# Patient Record
Sex: FEMALE | Race: WHITE | NOT HISPANIC OR LATINO | Employment: OTHER | URBAN - METROPOLITAN AREA
[De-identification: names, ages, dates, MRNs, and addresses within clinical notes are randomized per-mention and may not be internally consistent; named-entity substitution may affect disease eponyms.]

---

## 2017-06-13 ENCOUNTER — HOSPITAL ENCOUNTER (OUTPATIENT)
Dept: LAB | Facility: HOSPITAL | Age: 67
Setting detail: SPECIMEN
Discharge: HOME | End: 2017-06-13
Attending: INTERNAL MEDICINE | Admitting: INTERNAL MEDICINE

## 2017-10-19 ENCOUNTER — HOSPITAL ENCOUNTER (OUTPATIENT)
Dept: LAB | Facility: HOSPITAL | Age: 67
Setting detail: SPECIMEN
Discharge: HOME | End: 2017-10-19
Attending: INTERNAL MEDICINE | Admitting: INTERNAL MEDICINE

## 2017-10-19 LAB
ALBUMIN SERPL-MCNC: 3.7 G/DL (ref 3.5–4.8)
ALBUMIN/GLOB SERPL: 1.3 {RATIO} (ref 1–1.7)
ALP SERPL-CCNC: 69 IU/L (ref 32–91)
ALT SERPL-CCNC: 17 IU/L (ref 14–54)
ANION GAP SERPL CALC-SCNC: 13.1 MMOL/L (ref 10–20)
AST SERPL-CCNC: 21 IU/L (ref 15–41)
BILIRUB SERPL-MCNC: 0.1 MG/DL (ref 0.3–1.2)
BUN SERPL-MCNC: 7 MG/DL (ref 8–20)
BUN/CREAT SERPL: 8.8 (ref 5.4–26.2)
CALCIUM SERPL-MCNC: 9 MG/DL (ref 8.9–10.3)
CHLORIDE SERPL-SCNC: 92 MMOL/L (ref 101–111)
CONV CO2: 24 MMOL/L (ref 22–32)
CONV TOTAL PROTEIN: 6.5 G/DL (ref 6.1–7.9)
CREAT UR-MCNC: 0.8 MG/DL (ref 0.4–1)
GLOBULIN UR ELPH-MCNC: 2.8 G/DL (ref 2.5–3.8)
GLUCOSE SERPL-MCNC: 234 MG/DL (ref 65–99)
POTASSIUM SERPL-SCNC: 4.1 MMOL/L (ref 3.6–5.1)
SODIUM SERPL-SCNC: 125 MMOL/L (ref 136–144)

## 2017-12-06 ENCOUNTER — HOSPITAL ENCOUNTER (OUTPATIENT)
Dept: PREOP | Facility: HOSPITAL | Age: 67
Setting detail: HOSPITAL OUTPATIENT SURGERY
Discharge: HOME | End: 2017-12-06
Attending: INTERNAL MEDICINE | Admitting: INTERNAL MEDICINE

## 2017-12-06 ENCOUNTER — ON CAMPUS - OUTPATIENT (AMBULATORY)
Dept: URBAN - METROPOLITAN AREA HOSPITAL 85 | Facility: HOSPITAL | Age: 67
End: 2017-12-06
Payer: COMMERCIAL

## 2017-12-06 DIAGNOSIS — D12.3 BENIGN NEOPLASM OF TRANSVERSE COLON: ICD-10-CM

## 2017-12-06 DIAGNOSIS — Z86.010 PERSONAL HISTORY OF COLONIC POLYPS: ICD-10-CM

## 2017-12-06 DIAGNOSIS — D12.2 BENIGN NEOPLASM OF ASCENDING COLON: ICD-10-CM

## 2017-12-06 DIAGNOSIS — K57.30 DIVERTICULOSIS OF LARGE INTESTINE WITHOUT PERFORATION OR ABS: ICD-10-CM

## 2017-12-06 LAB
GLUCOSE BLD-MCNC: 182 MG/DL (ref 70–105)
GLUCOSE BLD-MCNC: 200 MG/DL (ref 70–105)

## 2017-12-06 PROCEDURE — 45385 COLONOSCOPY W/LESION REMOVAL: CPT | Mod: PT | Performed by: INTERNAL MEDICINE

## 2017-12-19 ENCOUNTER — HOSPITAL ENCOUNTER (OUTPATIENT)
Dept: LAB | Facility: HOSPITAL | Age: 67
Setting detail: SPECIMEN
Discharge: HOME | End: 2017-12-19
Attending: INTERNAL MEDICINE | Admitting: INTERNAL MEDICINE

## 2018-04-10 ENCOUNTER — HOSPITAL ENCOUNTER (OUTPATIENT)
Dept: LAB | Facility: HOSPITAL | Age: 68
Setting detail: SPECIMEN
Discharge: HOME | End: 2018-04-10
Attending: INTERNAL MEDICINE | Admitting: INTERNAL MEDICINE

## 2018-04-10 LAB
ALBUMIN SERPL-MCNC: 3.9 G/DL (ref 3.5–4.8)
ALBUMIN/GLOB SERPL: 1.5 {RATIO} (ref 1–1.7)
ALP SERPL-CCNC: 60 IU/L (ref 32–91)
ALT SERPL-CCNC: ABNORMAL IU/L (ref 14–54)
ANION GAP SERPL CALC-SCNC: 12.1 MMOL/L (ref 10–20)
AST SERPL-CCNC: ABNORMAL IU/L (ref 15–41)
BILIRUB SERPL-MCNC: ABNORMAL MG/DL (ref 0.3–1.2)
BUN SERPL-MCNC: 8 MG/DL (ref 8–20)
BUN/CREAT SERPL: 8 (ref 5.4–26.2)
CALCIUM SERPL-MCNC: 9 MG/DL (ref 8.9–10.3)
CHLORIDE SERPL-SCNC: 95 MMOL/L (ref 101–111)
CONV CO2: 25 MMOL/L (ref 22–32)
CONV TOTAL PROTEIN: 6.5 G/DL (ref 6.1–7.9)
CREAT UR-MCNC: 1 MG/DL (ref 0.4–1)
GLOBULIN UR ELPH-MCNC: 2.6 G/DL (ref 2.5–3.8)
GLUCOSE SERPL-MCNC: 127 MG/DL (ref 65–99)
POTASSIUM SERPL-SCNC: ABNORMAL MMOL/L (ref 3.6–5.1)
SODIUM SERPL-SCNC: 128 MMOL/L (ref 136–144)

## 2018-10-26 ENCOUNTER — HOSPITAL ENCOUNTER (OUTPATIENT)
Dept: LAB | Facility: HOSPITAL | Age: 68
Setting detail: SPECIMEN
Discharge: HOME | End: 2018-10-26
Attending: INTERNAL MEDICINE | Admitting: INTERNAL MEDICINE

## 2018-10-26 LAB
ALBUMIN SERPL-MCNC: 3.7 G/DL (ref 3.5–4.8)
ALBUMIN/GLOB SERPL: 1.4 {RATIO} (ref 1–1.7)
ALP SERPL-CCNC: 66 IU/L (ref 32–91)
ALT SERPL-CCNC: 16 IU/L (ref 14–54)
ANION GAP SERPL CALC-SCNC: 14.3 MMOL/L (ref 10–20)
AST SERPL-CCNC: 18 IU/L (ref 15–41)
BILIRUB SERPL-MCNC: 0.3 MG/DL (ref 0.3–1.2)
BUN SERPL-MCNC: 9 MG/DL (ref 8–20)
BUN/CREAT SERPL: 9 (ref 5.4–26.2)
CALCIUM SERPL-MCNC: 9.3 MG/DL (ref 8.9–10.3)
CHLORIDE SERPL-SCNC: 98 MMOL/L (ref 101–111)
CHOLEST SERPL-MCNC: 186 MG/DL
CHOLEST/HDLC SERPL: 2.9 {RATIO}
CONV CO2: 27 MMOL/L (ref 22–32)
CONV LDL CHOLESTEROL DIRECT: 108 MG/DL (ref 0–100)
CONV MICROALBUM.,U,RANDOM: 3 MG/L
CONV TOTAL PROTEIN: 6.3 G/DL (ref 6.1–7.9)
CREAT 24H UR-MCNC: 30.4 MG/DL
CREAT UR-MCNC: 1 MG/DL (ref 0.4–1)
GLOBULIN UR ELPH-MCNC: 2.6 G/DL (ref 2.5–3.8)
GLUCOSE SERPL-MCNC: 148 MG/DL (ref 65–99)
HBA1C MFR BLD: 8.2 % (ref 0–5.6)
HDLC SERPL-MCNC: 64 MG/DL
LDLC/HDLC SERPL: 1.7 {RATIO}
LIPID INTERPRETATION: ABNORMAL
MICROALBUMIN/CREAT UR: 9.9 UG/MG
POTASSIUM SERPL-SCNC: 4.3 MMOL/L (ref 3.6–5.1)
SODIUM SERPL-SCNC: 135 MMOL/L (ref 136–144)
TRIGL SERPL-MCNC: 127 MG/DL
TSH SERPL-ACNC: 2.33 UIU/ML (ref 0.34–5.6)
VLDLC SERPL CALC-MCNC: 14.4 MG/DL

## 2019-06-21 ENCOUNTER — OFFICE (AMBULATORY)
Dept: URBAN - METROPOLITAN AREA CLINIC 64 | Facility: CLINIC | Age: 69
End: 2019-06-21

## 2019-06-21 VITALS
DIASTOLIC BLOOD PRESSURE: 80 MMHG | SYSTOLIC BLOOD PRESSURE: 169 MMHG | HEART RATE: 78 BPM | WEIGHT: 176 LBS | HEIGHT: 59 IN

## 2019-06-21 DIAGNOSIS — R10.12 LEFT UPPER QUADRANT PAIN: ICD-10-CM

## 2019-06-21 DIAGNOSIS — K21.9 GASTRO-ESOPHAGEAL REFLUX DISEASE WITHOUT ESOPHAGITIS: ICD-10-CM

## 2019-06-21 DIAGNOSIS — R10.31 RIGHT LOWER QUADRANT PAIN: ICD-10-CM

## 2019-06-21 PROCEDURE — 99214 OFFICE O/P EST MOD 30 MIN: CPT | Performed by: INTERNAL MEDICINE

## 2019-06-21 RX ORDER — PANTOPRAZOLE SODIUM 40 MG/1
TABLET, DELAYED RELEASE ORAL
Qty: 180 | Refills: 3 | Status: COMPLETED
End: 2019-10-30

## 2019-06-24 RX ORDER — GABAPENTIN 400 MG/1
CAPSULE ORAL
Qty: 90 CAPSULE | Refills: 0 | OUTPATIENT
Start: 2019-06-24

## 2019-07-26 ENCOUNTER — OFFICE (AMBULATORY)
Dept: URBAN - METROPOLITAN AREA CLINIC 64 | Facility: CLINIC | Age: 69
End: 2019-07-26

## 2019-07-26 VITALS
SYSTOLIC BLOOD PRESSURE: 149 MMHG | DIASTOLIC BLOOD PRESSURE: 66 MMHG | HEART RATE: 62 BPM | WEIGHT: 169 LBS | HEIGHT: 59 IN

## 2019-07-26 DIAGNOSIS — R10.12 LEFT UPPER QUADRANT PAIN: ICD-10-CM

## 2019-07-26 DIAGNOSIS — D50.9 IRON DEFICIENCY ANEMIA, UNSPECIFIED: ICD-10-CM

## 2019-07-26 DIAGNOSIS — K21.9 GASTRO-ESOPHAGEAL REFLUX DISEASE WITHOUT ESOPHAGITIS: ICD-10-CM

## 2019-07-26 DIAGNOSIS — K31.84 GASTROPARESIS: ICD-10-CM

## 2019-07-26 PROCEDURE — 99213 OFFICE O/P EST LOW 20 MIN: CPT | Performed by: INTERNAL MEDICINE

## 2019-07-26 RX ORDER — OMEPRAZOLE 20 MG/1
20 CAPSULE, DELAYED RELEASE ORAL
Qty: 90 | Refills: 3 | Status: COMPLETED
Start: 2019-07-26 | End: 2020-02-06

## 2019-10-30 ENCOUNTER — OFFICE (AMBULATORY)
Dept: URBAN - METROPOLITAN AREA PATHOLOGY 4 | Facility: PATHOLOGY | Age: 69
End: 2019-10-30

## 2019-10-30 ENCOUNTER — ON CAMPUS - OUTPATIENT (AMBULATORY)
Dept: URBAN - METROPOLITAN AREA HOSPITAL 2 | Facility: HOSPITAL | Age: 69
End: 2019-10-30

## 2019-10-30 VITALS
HEART RATE: 66 BPM | SYSTOLIC BLOOD PRESSURE: 144 MMHG | RESPIRATION RATE: 16 BRPM | SYSTOLIC BLOOD PRESSURE: 119 MMHG | HEART RATE: 65 BPM | DIASTOLIC BLOOD PRESSURE: 60 MMHG | SYSTOLIC BLOOD PRESSURE: 142 MMHG | RESPIRATION RATE: 17 BRPM | SYSTOLIC BLOOD PRESSURE: 136 MMHG | DIASTOLIC BLOOD PRESSURE: 65 MMHG | TEMPERATURE: 97.5 F | DIASTOLIC BLOOD PRESSURE: 41 MMHG | SYSTOLIC BLOOD PRESSURE: 112 MMHG | HEART RATE: 68 BPM | RESPIRATION RATE: 18 BRPM | HEART RATE: 74 BPM | HEART RATE: 64 BPM | OXYGEN SATURATION: 99 % | SYSTOLIC BLOOD PRESSURE: 126 MMHG | HEART RATE: 73 BPM | HEART RATE: 67 BPM | SYSTOLIC BLOOD PRESSURE: 132 MMHG | HEIGHT: 59 IN | HEART RATE: 95 BPM | DIASTOLIC BLOOD PRESSURE: 39 MMHG | HEART RATE: 71 BPM | DIASTOLIC BLOOD PRESSURE: 63 MMHG | SYSTOLIC BLOOD PRESSURE: 135 MMHG | SYSTOLIC BLOOD PRESSURE: 148 MMHG | DIASTOLIC BLOOD PRESSURE: 62 MMHG | DIASTOLIC BLOOD PRESSURE: 57 MMHG | SYSTOLIC BLOOD PRESSURE: 131 MMHG | DIASTOLIC BLOOD PRESSURE: 61 MMHG | OXYGEN SATURATION: 98 % | WEIGHT: 162 LBS | DIASTOLIC BLOOD PRESSURE: 56 MMHG | OXYGEN SATURATION: 95 % | OXYGEN SATURATION: 100 % | HEART RATE: 70 BPM

## 2019-10-30 DIAGNOSIS — D12.2 BENIGN NEOPLASM OF ASCENDING COLON: ICD-10-CM

## 2019-10-30 DIAGNOSIS — D50.9 IRON DEFICIENCY ANEMIA, UNSPECIFIED: ICD-10-CM

## 2019-10-30 DIAGNOSIS — D12.3 BENIGN NEOPLASM OF TRANSVERSE COLON: ICD-10-CM

## 2019-10-30 DIAGNOSIS — K29.71 GASTRITIS, UNSPECIFIED, WITH BLEEDING: ICD-10-CM

## 2019-10-30 DIAGNOSIS — K57.30 DIVERTICULOSIS OF LARGE INTESTINE WITHOUT PERFORATION OR ABS: ICD-10-CM

## 2019-10-30 DIAGNOSIS — K31.89 OTHER DISEASES OF STOMACH AND DUODENUM: ICD-10-CM

## 2019-10-30 LAB
GI HISTOLOGY: A. UNSPECIFIED: (no result)
GI HISTOLOGY: B. SELECT: (no result)
GI HISTOLOGY: C. UNSPECIFIED: (no result)
GI HISTOLOGY: D. UNSPECIFIED: (no result)
GI HISTOLOGY: PDF REPORT: (no result)

## 2019-10-30 PROCEDURE — 45385 COLONOSCOPY W/LESION REMOVAL: CPT | Performed by: INTERNAL MEDICINE

## 2019-10-30 PROCEDURE — 43239 EGD BIOPSY SINGLE/MULTIPLE: CPT | Performed by: INTERNAL MEDICINE

## 2019-10-30 PROCEDURE — 88305 TISSUE EXAM BY PATHOLOGIST: CPT | Performed by: INTERNAL MEDICINE

## 2019-10-30 NOTE — SERVICEHPINOTES
surgery went well.  Right revision of knee replacement. doesn't know last hgb, thinks better. Colonoscopy 12/17 - 3 TA polyps, diverticulosis, sigmoid colon looping. Recall 2020. BREGD 7/16 - 53F Gay dilation, gastritis. Gastric bx neg h.pylori.BREGD 9/14 - gastritis and candida esophagitis. Gastric bx's neg.  DANNY COWAN  is a  69  female   who presents today for a  EGD-Colonoscopy   for   the indications listed below. The updated Patient Profile was reviewed prior to the procedure, in conjunction with the Physical Exam, including medical conditions, surgical procedures, medications, allergies, family history and social history. See Physical Exam time stamp below for date and time of HPI completion.Pre-operatively, I reviewed the indication(s) for the procedure, the risks of the procedure [including but not limited to: unexpected bleeding possibly requiring hospitalization and/or unplanned repeat procedures, perforation possibly requiring surgical treatment, missed lesions and complications of sedation/MAC (also explained by anesthesia staff)]. I have evaluated the patient for risks associated with the planned anesthesia and the procedure to be performed and find the patient an acceptable candidate for IV sedation.Multiple opportunities were provided for any questions or concerns, and all questions were answered satisfactorily before any anesthesia was administered. We will proceed with the planned procedure.BR

## 2021-04-14 PROCEDURE — U0003 INFECTIOUS AGENT DETECTION BY NUCLEIC ACID (DNA OR RNA); SEVERE ACUTE RESPIRATORY SYNDROME CORONAVIRUS 2 (SARS-COV-2) (CORONAVIRUS DISEASE [COVID-19]), AMPLIFIED PROBE TECHNIQUE, MAKING USE OF HIGH THROUGHPUT TECHNOLOGIES AS DESCRIBED BY CMS-2020-01-R: HCPCS

## 2021-04-23 ENCOUNTER — OFFICE (AMBULATORY)
Dept: URBAN - METROPOLITAN AREA CLINIC 64 | Facility: CLINIC | Age: 71
End: 2021-04-23

## 2021-04-23 VITALS
DIASTOLIC BLOOD PRESSURE: 69 MMHG | SYSTOLIC BLOOD PRESSURE: 158 MMHG | HEART RATE: 82 BPM | WEIGHT: 183 LBS | HEIGHT: 59 IN

## 2021-04-23 DIAGNOSIS — K59.00 CONSTIPATION, UNSPECIFIED: ICD-10-CM

## 2021-04-23 DIAGNOSIS — D64.9 ANEMIA, UNSPECIFIED: ICD-10-CM

## 2021-04-23 DIAGNOSIS — R10.9 UNSPECIFIED ABDOMINAL PAIN: ICD-10-CM

## 2021-04-23 PROCEDURE — 99213 OFFICE O/P EST LOW 20 MIN: CPT | Performed by: NURSE PRACTITIONER

## 2021-04-23 RX ORDER — DICYCLOMINE HYDROCHLORIDE 10 MG/1
20 CAPSULE ORAL
Qty: 60 | Refills: 6 | Status: COMPLETED
Start: 2021-04-23 | End: 2024-04-11

## 2021-06-22 ENCOUNTER — OFFICE (AMBULATORY)
Dept: URBAN - METROPOLITAN AREA CLINIC 64 | Facility: CLINIC | Age: 71
End: 2021-06-22

## 2021-06-22 VITALS
HEART RATE: 74 BPM | HEIGHT: 59 IN | DIASTOLIC BLOOD PRESSURE: 63 MMHG | SYSTOLIC BLOOD PRESSURE: 128 MMHG | WEIGHT: 196 LBS

## 2021-06-22 DIAGNOSIS — R10.84 GENERALIZED ABDOMINAL PAIN: ICD-10-CM

## 2021-06-22 DIAGNOSIS — K21.9 GASTRO-ESOPHAGEAL REFLUX DISEASE WITHOUT ESOPHAGITIS: ICD-10-CM

## 2021-06-22 DIAGNOSIS — R13.10 DYSPHAGIA, UNSPECIFIED: ICD-10-CM

## 2021-06-22 PROCEDURE — 99214 OFFICE O/P EST MOD 30 MIN: CPT | Performed by: INTERNAL MEDICINE

## 2021-06-22 RX ORDER — LANSOPRAZOLE 30 MG/1
60 CAPSULE, DELAYED RELEASE PELLETS ORAL
Qty: 60 | Refills: 11 | Status: COMPLETED
Start: 2021-06-22 | End: 2021-09-22

## 2021-06-22 RX ORDER — OMEPRAZOLE 40 MG/1
CAPSULE, DELAYED RELEASE ORAL
Qty: 60 | Refills: 3 | Status: COMPLETED
End: 2021-06-22

## 2021-08-23 ENCOUNTER — ON CAMPUS - OUTPATIENT (AMBULATORY)
Dept: URBAN - METROPOLITAN AREA HOSPITAL 2 | Facility: HOSPITAL | Age: 71
End: 2021-08-23

## 2021-08-23 VITALS
WEIGHT: 174 LBS | SYSTOLIC BLOOD PRESSURE: 132 MMHG | DIASTOLIC BLOOD PRESSURE: 77 MMHG | OXYGEN SATURATION: 98 % | DIASTOLIC BLOOD PRESSURE: 58 MMHG | HEART RATE: 66 BPM | OXYGEN SATURATION: 99 % | HEIGHT: 59 IN | HEART RATE: 70 BPM | RESPIRATION RATE: 18 BRPM | DIASTOLIC BLOOD PRESSURE: 53 MMHG | SYSTOLIC BLOOD PRESSURE: 134 MMHG | RESPIRATION RATE: 23 BRPM | SYSTOLIC BLOOD PRESSURE: 139 MMHG | TEMPERATURE: 95.7 F | SYSTOLIC BLOOD PRESSURE: 129 MMHG | DIASTOLIC BLOOD PRESSURE: 61 MMHG | DIASTOLIC BLOOD PRESSURE: 57 MMHG | RESPIRATION RATE: 16 BRPM | RESPIRATION RATE: 22 BRPM | RESPIRATION RATE: 20 BRPM | HEART RATE: 64 BPM | OXYGEN SATURATION: 97 % | DIASTOLIC BLOOD PRESSURE: 65 MMHG | RESPIRATION RATE: 17 BRPM | HEART RATE: 62 BPM | OXYGEN SATURATION: 100 % | SYSTOLIC BLOOD PRESSURE: 131 MMHG | HEART RATE: 69 BPM | SYSTOLIC BLOOD PRESSURE: 140 MMHG | DIASTOLIC BLOOD PRESSURE: 60 MMHG

## 2021-08-23 DIAGNOSIS — B37.81 CANDIDAL ESOPHAGITIS: ICD-10-CM

## 2021-08-23 DIAGNOSIS — R10.13 EPIGASTRIC PAIN: ICD-10-CM

## 2021-08-23 DIAGNOSIS — R13.10 DYSPHAGIA, UNSPECIFIED: ICD-10-CM

## 2021-08-23 DIAGNOSIS — K22.2 ESOPHAGEAL OBSTRUCTION: ICD-10-CM

## 2021-08-23 PROCEDURE — 43235 EGD DIAGNOSTIC BRUSH WASH: CPT | Performed by: INTERNAL MEDICINE

## 2021-08-23 PROCEDURE — 43450 DILATE ESOPHAGUS 1/MULT PASS: CPT | Performed by: INTERNAL MEDICINE

## 2021-08-23 RX ORDER — NYSTATIN 100000 [USP'U]/ML
2000 SUSPENSION ORAL
Qty: 280 | Refills: 1 | Status: COMPLETED
Start: 2021-08-23 | End: 2021-09-22

## 2021-08-23 RX ADMIN — Medication 8 GRAMS: at 15:57

## 2021-08-23 RX ADMIN — Medication 4 GRAMS: at 16:12

## 2021-09-16 ENCOUNTER — TRANSCRIBE ORDERS (OUTPATIENT)
Dept: PHYSICAL THERAPY | Facility: CLINIC | Age: 71
End: 2021-09-16

## 2021-09-16 DIAGNOSIS — M54.50 CHRONIC LOW BACK PAIN, UNSPECIFIED BACK PAIN LATERALITY, UNSPECIFIED WHETHER SCIATICA PRESENT: Primary | ICD-10-CM

## 2021-09-16 DIAGNOSIS — G89.29 CHRONIC LOW BACK PAIN, UNSPECIFIED BACK PAIN LATERALITY, UNSPECIFIED WHETHER SCIATICA PRESENT: Primary | ICD-10-CM

## 2021-09-22 ENCOUNTER — OFFICE (AMBULATORY)
Dept: URBAN - METROPOLITAN AREA CLINIC 64 | Facility: CLINIC | Age: 71
End: 2021-09-22

## 2021-09-22 VITALS
SYSTOLIC BLOOD PRESSURE: 143 MMHG | HEIGHT: 59 IN | HEART RATE: 73 BPM | DIASTOLIC BLOOD PRESSURE: 72 MMHG | WEIGHT: 178 LBS

## 2021-09-22 DIAGNOSIS — R10.84 GENERALIZED ABDOMINAL PAIN: ICD-10-CM

## 2021-09-22 DIAGNOSIS — K31.84 GASTROPARESIS: ICD-10-CM

## 2021-09-22 PROCEDURE — 99213 OFFICE O/P EST LOW 20 MIN: CPT | Performed by: INTERNAL MEDICINE

## 2021-09-22 RX ORDER — LANSOPRAZOLE 30 MG/1
60 CAPSULE, DELAYED RELEASE PELLETS ORAL
Qty: 60 | Refills: 11 | Status: COMPLETED
Start: 2021-06-22 | End: 2021-09-22

## 2021-09-22 RX ORDER — DEXLANSOPRAZOLE 60 MG/1
60 CAPSULE, DELAYED RELEASE ORAL
Qty: 90 | Refills: 3 | Status: COMPLETED
Start: 2021-09-22 | End: 2022-04-22

## 2021-12-30 ENCOUNTER — EMERGENCY VISIT (OUTPATIENT)
Dept: URBAN - METROPOLITAN AREA CLINIC 48 | Facility: CLINIC | Age: 71
End: 2021-12-30

## 2021-12-30 DIAGNOSIS — H04.123: ICD-10-CM

## 2021-12-30 PROCEDURE — 92002 INTRM OPH EXAM NEW PATIENT: CPT

## 2021-12-30 ASSESSMENT — VISUAL ACUITY
OS_CC: 20/50
OD_PH: 20/30
OD_CC: 20/50
OS_PH: 20/30
OU_CC: 20/40

## 2021-12-30 ASSESSMENT — TONOMETRY
OS_IOP_MMHG: 15
OD_IOP_MMHG: 15

## 2021-12-30 NOTE — PATIENT DISCUSSION
No foreign body found on lid eversion. Will start patient on Refresh Celluvisc 1gtt Q2-3H, OU. Will begin FML 1gtt BID OD for two weeks. RTC 2 week f/u.

## 2022-04-22 ENCOUNTER — OFFICE (AMBULATORY)
Dept: URBAN - METROPOLITAN AREA CLINIC 64 | Facility: CLINIC | Age: 72
End: 2022-04-22

## 2022-04-22 VITALS
HEIGHT: 59 IN | WEIGHT: 177 LBS | HEART RATE: 64 BPM | DIASTOLIC BLOOD PRESSURE: 75 MMHG | SYSTOLIC BLOOD PRESSURE: 140 MMHG

## 2022-04-22 DIAGNOSIS — K31.84 GASTROPARESIS: ICD-10-CM

## 2022-04-22 DIAGNOSIS — K21.9 GASTRO-ESOPHAGEAL REFLUX DISEASE WITHOUT ESOPHAGITIS: ICD-10-CM

## 2022-04-22 PROCEDURE — 99214 OFFICE O/P EST MOD 30 MIN: CPT | Performed by: INTERNAL MEDICINE

## 2022-05-16 ENCOUNTER — OFFICE (AMBULATORY)
Dept: URBAN - METROPOLITAN AREA CLINIC 51 | Facility: CLINIC | Age: 72
End: 2022-05-16
Payer: COMMERCIAL

## 2022-05-16 DIAGNOSIS — K62.89 OTHER SPECIFIED DISEASES OF ANUS AND RECTUM: ICD-10-CM

## 2022-05-16 DIAGNOSIS — R15.9 FULL INCONTINENCE OF FECES: ICD-10-CM

## 2022-05-16 PROCEDURE — 91122: CPT | Performed by: INTERNAL MEDICINE

## 2022-05-16 PROCEDURE — 91120: CPT | Mod: 59 | Performed by: INTERNAL MEDICINE

## 2022-05-24 ENCOUNTER — OFFICE (AMBULATORY)
Dept: URBAN - METROPOLITAN AREA CLINIC 64 | Facility: CLINIC | Age: 72
End: 2022-05-24

## 2022-05-24 VITALS
HEART RATE: 72 BPM | DIASTOLIC BLOOD PRESSURE: 67 MMHG | SYSTOLIC BLOOD PRESSURE: 136 MMHG | HEIGHT: 59 IN | WEIGHT: 181 LBS

## 2022-05-24 DIAGNOSIS — K31.84 GASTROPARESIS: ICD-10-CM

## 2022-05-24 DIAGNOSIS — K59.00 CONSTIPATION, UNSPECIFIED: ICD-10-CM

## 2022-05-24 PROCEDURE — 99213 OFFICE O/P EST LOW 20 MIN: CPT | Performed by: NURSE PRACTITIONER

## 2022-05-26 ENCOUNTER — PREP FOR SURGERY (OUTPATIENT)
Dept: OTHER | Facility: HOSPITAL | Age: 72
End: 2022-05-26

## 2022-05-26 ENCOUNTER — OFFICE VISIT (OUTPATIENT)
Dept: ORTHOPEDIC SURGERY | Facility: CLINIC | Age: 72
End: 2022-05-26

## 2022-05-26 VITALS
DIASTOLIC BLOOD PRESSURE: 71 MMHG | BODY MASS INDEX: 36.69 KG/M2 | HEART RATE: 69 BPM | HEIGHT: 59 IN | SYSTOLIC BLOOD PRESSURE: 115 MMHG | WEIGHT: 182 LBS

## 2022-05-26 DIAGNOSIS — R94.120 ABNORMAL AUDITORY FUNCTION STUDY: ICD-10-CM

## 2022-05-26 DIAGNOSIS — R79.9 ABNORMAL FINDING OF BLOOD CHEMISTRY, UNSPECIFIED: ICD-10-CM

## 2022-05-26 DIAGNOSIS — M19.011 OSTEOARTHRITIS OF RIGHT GLENOHUMERAL JOINT: Primary | ICD-10-CM

## 2022-05-26 DIAGNOSIS — M25.511 ACUTE PAIN OF RIGHT SHOULDER: Primary | ICD-10-CM

## 2022-05-26 DIAGNOSIS — R79.1 ABNORMAL COAGULATION PROFILE: ICD-10-CM

## 2022-05-26 DIAGNOSIS — M19.011 OSTEOARTHRITIS OF RIGHT GLENOHUMERAL JOINT: ICD-10-CM

## 2022-05-26 PROCEDURE — 99204 OFFICE O/P NEW MOD 45 MIN: CPT | Performed by: ORTHOPAEDIC SURGERY

## 2022-05-26 PROCEDURE — 97760 ORTHOTIC MGMT&TRAING 1ST ENC: CPT | Performed by: ORTHOPAEDIC SURGERY

## 2022-05-26 RX ORDER — PROCHLORPERAZINE 25 MG/1
SUPPOSITORY RECTAL
Status: ON HOLD | COMMUNITY
Start: 2022-05-13 | End: 2022-07-02

## 2022-05-26 RX ORDER — SPIRONOLACTONE 50 MG/1
50 TABLET, FILM COATED ORAL DAILY
COMMUNITY
Start: 2022-05-06 | End: 2022-07-03 | Stop reason: HOSPADM

## 2022-05-26 RX ORDER — OXYCODONE HCL 10 MG/1
10 TABLET, FILM COATED, EXTENDED RELEASE ORAL ONCE
Status: CANCELLED | OUTPATIENT
Start: 2022-05-26 | End: 2022-05-26

## 2022-05-26 RX ORDER — MELOXICAM 15 MG/1
15 TABLET ORAL ONCE
Status: CANCELLED | OUTPATIENT
Start: 2022-05-26 | End: 2022-05-26

## 2022-05-26 RX ORDER — TELMISARTAN 40 MG/1
40 TABLET ORAL 2 TIMES DAILY
COMMUNITY
Start: 2022-05-02

## 2022-05-26 RX ORDER — DEXTRAN 70/HYPROMELLOSE
1 DROPS OPHTHALMIC (EYE)
Status: ON HOLD | COMMUNITY
End: 2022-07-02

## 2022-05-26 RX ORDER — DEXLANSOPRAZOLE 60 MG/1
60 CAPSULE, DELAYED RELEASE ORAL DAILY
COMMUNITY

## 2022-05-26 RX ORDER — PROCHLORPERAZINE 25 MG/1
SUPPOSITORY RECTAL SEE ADMIN INSTRUCTIONS
Status: ON HOLD | COMMUNITY
Start: 2022-05-14 | End: 2022-07-02

## 2022-05-26 RX ORDER — PREGABALIN 75 MG/1
150 CAPSULE ORAL ONCE
Status: CANCELLED | OUTPATIENT
Start: 2022-05-26 | End: 2022-05-26

## 2022-05-26 RX ORDER — INSULIN LISPRO 100 [IU]/ML
16 INJECTION, SUSPENSION SUBCUTANEOUS 2 TIMES DAILY WITH MEALS
Status: ON HOLD | COMMUNITY
End: 2022-07-02

## 2022-05-26 RX ORDER — HYDRALAZINE HYDROCHLORIDE 100 MG/1
100 TABLET, FILM COATED ORAL 3 TIMES DAILY
COMMUNITY
Start: 2022-05-15

## 2022-05-26 RX ORDER — TRANEXAMIC ACID 10 MG/ML
1000 INJECTION, SOLUTION INTRAVENOUS ONCE
Status: CANCELLED | OUTPATIENT
Start: 2022-05-26 | End: 2022-05-26

## 2022-05-26 RX ORDER — PROCHLORPERAZINE 25 MG/1
SUPPOSITORY RECTAL
Status: ON HOLD | COMMUNITY
Start: 2022-05-20 | End: 2022-07-02

## 2022-05-26 RX ORDER — NORTRIPTYLINE HYDROCHLORIDE 75 MG/1
75 CAPSULE ORAL
COMMUNITY
Start: 2022-03-23 | End: 2022-07-03 | Stop reason: HOSPADM

## 2022-05-26 RX ORDER — NITROGLYCERIN 0.4 MG/1
TABLET SUBLINGUAL
Status: ON HOLD | COMMUNITY
End: 2022-07-02

## 2022-05-26 RX ORDER — DICYCLOMINE HYDROCHLORIDE 10 MG/1
CAPSULE ORAL
COMMUNITY
Start: 2022-03-21

## 2022-05-26 RX ORDER — GABAPENTIN 600 MG/1
600 TABLET ORAL 3 TIMES DAILY
COMMUNITY
Start: 2022-05-12

## 2022-05-26 RX ORDER — CLINDAMYCIN PHOSPHATE 900 MG/50ML
900 INJECTION, SOLUTION INTRAVENOUS ONCE
Status: CANCELLED | OUTPATIENT
Start: 2022-05-26 | End: 2022-05-26

## 2022-05-26 RX ORDER — GLIPIZIDE 10 MG/1
TABLET ORAL
COMMUNITY
End: 2022-05-26 | Stop reason: ALTCHOICE

## 2022-05-26 NOTE — PROGRESS NOTES
"     Patient ID: Ramona Luis is a 71 y.o. female.    Chief Complaint:    Chief Complaint   Patient presents with   • Right Shoulder - Pain       HPI:  This is a 71-year-old female here with longstanding right shoulder pain.  He has a distant history of rotator cuff repair with recent onset pain in the last several months.  She is taken oral medication without significant relief.  Pain is deep in the shoulder causing pain and weakness with overhead activity  Past Medical History:   Diagnosis Date   • Diabetes mellitus (HCC)    • Environmental allergies    • GERD (gastroesophageal reflux disease)    • Hypertension    • Migraine        No past surgical history on file.    Family History   Problem Relation Age of Onset   • Heart failure Mother    • Cancer Father           Social History     Occupational History   • Not on file   Tobacco Use   • Smoking status: Former Smoker     Quit date: 1980     Years since quittin.1   • Smokeless tobacco: Never Used   Substance and Sexual Activity   • Alcohol use: Yes     Comment: occasionally   • Drug use: Defer   • Sexual activity: Defer      Review of Systems   Cardiovascular: Negative for chest pain.   Musculoskeletal: Positive for arthralgias.       Objective:    /71   Pulse 69   Ht 149.9 cm (59\")   Wt 82.6 kg (182 lb)   BMI 36.76 kg/m²     Physical Examination:  Right shoulder demonstrates healed incision from mini open rotator cuff repair over the anterior lateral deltoid.  She has mild supraspinatus atrophy passive elevation 150 abduction 120 external rotation 30 internal rotation the right hip with pain and weakness on Speed, Callands, supraspinatus testing.  Belly press and lift off are 4/53/5 with active elevation of 90 degrees and intact deltoid function.  Sensory and motor exam are intact all distributions. Radial pulse is palpable and capillary refill is less than two seconds to all digits.    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  Postop sling dispensed.  Discussed the risks including bleeding, scar, infection, stiffness, nerve, artery, vein and tendon damage, instability, fracture, DVT, loss of life or limb. Issues of scapular notching and component revision were discussed. Questions were answered and addressed.  Greater than 15 minutes was spent demonstrating proper fit and use of the device and signs to monitor for complications      Procedures         Disclaimer: Part of this note may be an electronic transcription/translation of spoken language to printed text using the Dragon Dictation System

## 2022-05-31 ENCOUNTER — TELEPHONE (OUTPATIENT)
Dept: ORTHOPEDIC SURGERY | Facility: CLINIC | Age: 72
End: 2022-05-31

## 2022-05-31 NOTE — TELEPHONE ENCOUNTER
Caller: MARCELA CHOI    Relationship to patient:  SELF    Best call back number: [phone]    Patient is needing: PATIENT IS TRYING TO SCHEDULE SHOULDER SURGERY. SOMEONE CALLED HER TODAY AND UNABLE TO WARM TRANSFER.

## 2022-06-16 VITALS — BODY MASS INDEX: 36.73 KG/M2 | WEIGHT: 175 LBS | HEIGHT: 58 IN

## 2022-06-17 ENCOUNTER — PRE-ADMISSION TESTING (OUTPATIENT)
Dept: PREADMISSION TESTING | Facility: HOSPITAL | Age: 72
End: 2022-06-17

## 2022-06-17 ENCOUNTER — HOSPITAL ENCOUNTER (OUTPATIENT)
Dept: GENERAL RADIOLOGY | Facility: HOSPITAL | Age: 72
Discharge: HOME | End: 2022-06-17

## 2022-06-17 ENCOUNTER — HOSPITAL ENCOUNTER (OUTPATIENT)
Dept: CARDIOLOGY | Facility: HOSPITAL | Age: 72
Discharge: HOME | End: 2022-06-17

## 2022-06-17 ENCOUNTER — LAB (OUTPATIENT)
Dept: LAB | Facility: HOSPITAL | Age: 72
End: 2022-06-17

## 2022-06-17 DIAGNOSIS — R79.1 ABNORMAL COAGULATION PROFILE: ICD-10-CM

## 2022-06-17 DIAGNOSIS — M19.011 OSTEOARTHRITIS OF RIGHT GLENOHUMERAL JOINT: ICD-10-CM

## 2022-06-17 DIAGNOSIS — R79.9 ABNORMAL FINDING OF BLOOD CHEMISTRY, UNSPECIFIED: ICD-10-CM

## 2022-06-17 DIAGNOSIS — R94.120 ABNORMAL AUDITORY FUNCTION STUDY: ICD-10-CM

## 2022-06-17 LAB
ABO GROUP BLD: NORMAL
ANION GAP SERPL CALCULATED.3IONS-SCNC: 17.2 MMOL/L (ref 5–15)
APTT PPP: 28.4 SECONDS (ref 24–31)
BASOPHILS # BLD AUTO: 0.06 10*3/MM3 (ref 0–0.2)
BASOPHILS NFR BLD AUTO: 0.6 % (ref 0–1.5)
BLD GP AB SCN SERPL QL: NEGATIVE
BUN SERPL-MCNC: 17 MG/DL (ref 8–23)
BUN/CREAT SERPL: 14.9 (ref 7–25)
CALCIUM SPEC-SCNC: 9 MG/DL (ref 8.6–10.5)
CHLORIDE SERPL-SCNC: 89 MMOL/L (ref 98–107)
CO2 SERPL-SCNC: 20.8 MMOL/L (ref 22–29)
CREAT SERPL-MCNC: 1.14 MG/DL (ref 0.57–1)
DEPRECATED RDW RBC AUTO: 40.3 FL (ref 37–54)
EGFRCR SERPLBLD CKD-EPI 2021: 50.9 ML/MIN/1.73
EOSINOPHIL # BLD AUTO: 0.41 10*3/MM3 (ref 0–0.4)
EOSINOPHIL NFR BLD AUTO: 4.4 % (ref 0.3–6.2)
ERYTHROCYTE [DISTWIDTH] IN BLOOD BY AUTOMATED COUNT: 13 % (ref 12.3–15.4)
GLUCOSE SERPL-MCNC: 191 MG/DL (ref 65–99)
HBA1C MFR BLD: 8.7 % (ref 3.5–5.6)
HCT VFR BLD AUTO: 33.7 % (ref 34–46.6)
HGB BLD-MCNC: 10.9 G/DL (ref 12–15.9)
IMM GRANULOCYTES # BLD AUTO: 0.1 10*3/MM3 (ref 0–0.05)
IMM GRANULOCYTES NFR BLD AUTO: 1.1 % (ref 0–0.5)
INR PPP: 0.98 (ref 0.93–1.1)
LYMPHOCYTES # BLD AUTO: 1.7 10*3/MM3 (ref 0.7–3.1)
LYMPHOCYTES NFR BLD AUTO: 18.2 % (ref 19.6–45.3)
MCH RBC QN AUTO: 27.8 PG (ref 26.6–33)
MCHC RBC AUTO-ENTMCNC: 32.3 G/DL (ref 31.5–35.7)
MCV RBC AUTO: 86 FL (ref 79–97)
MONOCYTES # BLD AUTO: 0.76 10*3/MM3 (ref 0.1–0.9)
MONOCYTES NFR BLD AUTO: 8.1 % (ref 5–12)
MRSA DNA SPEC QL NAA+PROBE: NORMAL
NEUTROPHILS NFR BLD AUTO: 6.31 10*3/MM3 (ref 1.7–7)
NEUTROPHILS NFR BLD AUTO: 67.6 % (ref 42.7–76)
NRBC BLD AUTO-RTO: 0 /100 WBC (ref 0–0.2)
PLATELET # BLD AUTO: 382 10*3/MM3 (ref 140–450)
PMV BLD AUTO: 9.6 FL (ref 6–12)
POTASSIUM SERPL-SCNC: 5.1 MMOL/L (ref 3.5–5.2)
PROTHROMBIN TIME: 10.1 SECONDS (ref 9.6–11.7)
RBC # BLD AUTO: 3.92 10*6/MM3 (ref 3.77–5.28)
RH BLD: POSITIVE
SODIUM SERPL-SCNC: 127 MMOL/L (ref 136–145)
T&S EXPIRATION DATE: NORMAL
WBC NRBC COR # BLD: 9.34 10*3/MM3 (ref 3.4–10.8)

## 2022-06-17 PROCEDURE — 86900 BLOOD TYPING SEROLOGIC ABO: CPT

## 2022-06-17 PROCEDURE — 83036 HEMOGLOBIN GLYCOSYLATED A1C: CPT

## 2022-06-17 PROCEDURE — 97165 OT EVAL LOW COMPLEX 30 MIN: CPT

## 2022-06-17 PROCEDURE — 86850 RBC ANTIBODY SCREEN: CPT

## 2022-06-17 PROCEDURE — 87641 MR-STAPH DNA AMP PROBE: CPT

## 2022-06-17 PROCEDURE — 85730 THROMBOPLASTIN TIME PARTIAL: CPT

## 2022-06-17 PROCEDURE — 71046 X-RAY EXAM CHEST 2 VIEWS: CPT

## 2022-06-17 PROCEDURE — 80048 BASIC METABOLIC PNL TOTAL CA: CPT

## 2022-06-17 PROCEDURE — 86901 BLOOD TYPING SEROLOGIC RH(D): CPT

## 2022-06-17 PROCEDURE — 93010 ELECTROCARDIOGRAM REPORT: CPT | Performed by: INTERNAL MEDICINE

## 2022-06-17 PROCEDURE — 85610 PROTHROMBIN TIME: CPT

## 2022-06-17 PROCEDURE — 93005 ELECTROCARDIOGRAM TRACING: CPT | Performed by: ORTHOPAEDIC SURGERY

## 2022-06-17 PROCEDURE — 36415 COLL VENOUS BLD VENIPUNCTURE: CPT

## 2022-06-17 PROCEDURE — 85025 COMPLETE CBC W/AUTO DIFF WBC: CPT

## 2022-06-19 LAB — QT INTERVAL: 381 MS

## 2022-06-27 ENCOUNTER — LAB (OUTPATIENT)
Dept: LAB | Facility: HOSPITAL | Age: 72
End: 2022-06-27

## 2022-06-27 ENCOUNTER — APPOINTMENT (OUTPATIENT)
Dept: LAB | Facility: HOSPITAL | Age: 72
End: 2022-06-27

## 2022-06-27 DIAGNOSIS — M19.011 OSTEOARTHRITIS OF RIGHT GLENOHUMERAL JOINT: ICD-10-CM

## 2022-06-27 PROCEDURE — C9803 HOPD COVID-19 SPEC COLLECT: HCPCS

## 2022-06-27 PROCEDURE — U0004 COV-19 TEST NON-CDC HGH THRU: HCPCS

## 2022-06-28 ENCOUNTER — ANESTHESIA EVENT (OUTPATIENT)
Dept: PERIOP | Facility: HOSPITAL | Age: 72
End: 2022-06-28

## 2022-06-28 LAB — SARS-COV-2 ORF1AB RESP QL NAA+PROBE: NOT DETECTED

## 2022-06-29 ENCOUNTER — HOSPITAL ENCOUNTER (OUTPATIENT)
Facility: HOSPITAL | Age: 72
Discharge: HOME | End: 2022-06-30
Attending: ORTHOPAEDIC SURGERY | Admitting: ORTHOPAEDIC SURGERY

## 2022-06-29 ENCOUNTER — ANESTHESIA (OUTPATIENT)
Dept: PERIOP | Facility: HOSPITAL | Age: 72
End: 2022-06-29

## 2022-06-29 ENCOUNTER — APPOINTMENT (OUTPATIENT)
Dept: GENERAL RADIOLOGY | Facility: HOSPITAL | Age: 72
End: 2022-06-29

## 2022-06-29 DIAGNOSIS — Z96.611 STATUS POST REVERSE TOTAL SHOULDER REPLACEMENT, RIGHT: ICD-10-CM

## 2022-06-29 DIAGNOSIS — M19.011 OSTEOARTHRITIS OF RIGHT GLENOHUMERAL JOINT: ICD-10-CM

## 2022-06-29 DIAGNOSIS — M19.011 PRIMARY OSTEOARTHRITIS OF RIGHT SHOULDER: Primary | ICD-10-CM

## 2022-06-29 LAB
GLUCOSE BLDC GLUCOMTR-MCNC: 182 MG/DL (ref 70–105)
GLUCOSE BLDC GLUCOMTR-MCNC: 246 MG/DL (ref 70–105)

## 2022-06-29 PROCEDURE — 25010000002 ONDANSETRON PER 1 MG

## 2022-06-29 PROCEDURE — 25010000002 MIDAZOLAM PER 1 MG: Performed by: ANESTHESIOLOGY

## 2022-06-29 PROCEDURE — G0378 HOSPITAL OBSERVATION PER HR: HCPCS

## 2022-06-29 PROCEDURE — 63710000001 TRAMADOL 50 MG TABLET: Performed by: ORTHOPAEDIC SURGERY

## 2022-06-29 PROCEDURE — 76942 ECHO GUIDE FOR BIOPSY: CPT | Performed by: ORTHOPAEDIC SURGERY

## 2022-06-29 PROCEDURE — 23472 RECONSTRUCT SHOULDER JOINT: CPT | Performed by: NURSE PRACTITIONER

## 2022-06-29 PROCEDURE — A9270 NON-COVERED ITEM OR SERVICE: HCPCS | Performed by: ORTHOPAEDIC SURGERY

## 2022-06-29 PROCEDURE — 25010000002 PROPOFOL 10 MG/ML EMULSION

## 2022-06-29 PROCEDURE — 99214 OFFICE O/P EST MOD 30 MIN: CPT | Performed by: HOSPITALIST

## 2022-06-29 PROCEDURE — 63710000001 GLIPIZIDE 5 MG TABLET: Performed by: ORTHOPAEDIC SURGERY

## 2022-06-29 PROCEDURE — 63710000001 GABAPENTIN 600 MG TABLET: Performed by: ORTHOPAEDIC SURGERY

## 2022-06-29 PROCEDURE — 63710000001 INSULIN REGULAR HUMAN PER 5 UNITS: Performed by: ANESTHESIOLOGY

## 2022-06-29 PROCEDURE — 63710000001 METFORMIN ER 500 MG TABLET SUSTAINED-RELEASE 24 HOUR: Performed by: ORTHOPAEDIC SURGERY

## 2022-06-29 PROCEDURE — 25010000002 DEXAMETHASONE PER 1 MG: Performed by: ANESTHESIOLOGY

## 2022-06-29 PROCEDURE — A9270 NON-COVERED ITEM OR SERVICE: HCPCS | Performed by: ANESTHESIOLOGY

## 2022-06-29 PROCEDURE — 23472 RECONSTRUCT SHOULDER JOINT: CPT | Performed by: ORTHOPAEDIC SURGERY

## 2022-06-29 PROCEDURE — 63710000001 MONTELUKAST 10 MG TABLET: Performed by: ORTHOPAEDIC SURGERY

## 2022-06-29 PROCEDURE — 63710000001 POVIDONE-IODINE 10 % SOLUTION 118 ML BOTTLE: Performed by: ORTHOPAEDIC SURGERY

## 2022-06-29 PROCEDURE — C1713 ANCHOR/SCREW BN/BN,TIS/BN: HCPCS | Performed by: ORTHOPAEDIC SURGERY

## 2022-06-29 PROCEDURE — 73030 X-RAY EXAM OF SHOULDER: CPT

## 2022-06-29 PROCEDURE — 63710000001 ACETAMINOPHEN 325 MG TABLET: Performed by: ORTHOPAEDIC SURGERY

## 2022-06-29 PROCEDURE — 25010000002 VANCOMYCIN 1 G RECONSTITUTED SOLUTION 1 EACH VIAL: Performed by: ORTHOPAEDIC SURGERY

## 2022-06-29 PROCEDURE — 25010000002 CEFTRIAXONE PER 250 MG: Performed by: ORTHOPAEDIC SURGERY

## 2022-06-29 PROCEDURE — 63710000001 HYDRALAZINE 25 MG TABLET: Performed by: ORTHOPAEDIC SURGERY

## 2022-06-29 PROCEDURE — 63710000001 MELOXICAM 15 MG TABLET: Performed by: ORTHOPAEDIC SURGERY

## 2022-06-29 PROCEDURE — 25010000002 FENTANYL CITRATE (PF) 100 MCG/2ML SOLUTION

## 2022-06-29 PROCEDURE — C9290 INJ, BUPIVACAINE LIPOSOME: HCPCS | Performed by: ANESTHESIOLOGY

## 2022-06-29 PROCEDURE — 25010000002 MORPHINE PER 10 MG: Performed by: ORTHOPAEDIC SURGERY

## 2022-06-29 PROCEDURE — 63710000001 NORTRIPTYLINE 25 MG CAPSULE: Performed by: ORTHOPAEDIC SURGERY

## 2022-06-29 PROCEDURE — 97110 THERAPEUTIC EXERCISES: CPT

## 2022-06-29 PROCEDURE — 63710000001 BENZOYL PEROXIDE 10 % LIQUID: Performed by: ORTHOPAEDIC SURGERY

## 2022-06-29 PROCEDURE — C1776 JOINT DEVICE (IMPLANTABLE): HCPCS | Performed by: ORTHOPAEDIC SURGERY

## 2022-06-29 PROCEDURE — 0 BUPIVACAINE LIPOSOME 1.3 % SUSPENSION: Performed by: ANESTHESIOLOGY

## 2022-06-29 PROCEDURE — 82962 GLUCOSE BLOOD TEST: CPT

## 2022-06-29 DEVICE — TOTL REV SHLDR DJO: Type: IMPLANTABLE DEVICE | Site: SHOULDER | Status: FUNCTIONAL

## 2022-06-29 DEVICE — IMPLANTABLE DEVICE: Type: IMPLANTABLE DEVICE | Site: SHOULDER | Status: FUNCTIONAL

## 2022-06-29 DEVICE — STEM HUM ALTIVATE STD 8X108MM: Type: IMPLANTABLE DEVICE | Site: SHOULDER | Status: FUNCTIONAL

## 2022-06-29 DEVICE — BASEPLT GLEN REV RSP TI 26X30MM: Type: IMPLANTABLE DEVICE | Site: SHOULDER | Status: FUNCTIONAL

## 2022-06-29 DEVICE — SCREW, LOCKING BONE, RSP, 5X30
Type: IMPLANTABLE DEVICE | Site: SHOULDER | Status: FUNCTIONAL
Brand: DJO SURGICAL

## 2022-06-29 DEVICE — SUT NONABS MAXBRAID/PE NMBR2 HC5 38IN BLU 900334: Type: IMPLANTABLE DEVICE | Site: SHOULDER | Status: FUNCTIONAL

## 2022-06-29 DEVICE — SCREW, LOCKING BONE, RSP, 5X34
Type: IMPLANTABLE DEVICE | Site: SHOULDER | Status: FUNCTIONAL
Brand: DJO SURGICAL

## 2022-06-29 DEVICE — SCREW, LOCKING BONE, RSP, 5X14
Type: IMPLANTABLE DEVICE | Site: SHOULDER | Status: FUNCTIONAL
Brand: DJO SURGICAL

## 2022-06-29 DEVICE — INSRT SOCKT HUM/SHLDR ALTIVATE/REVERSE HXE/PE SZ32 SM: Type: IMPLANTABLE DEVICE | Site: SHOULDER | Status: FUNCTIONAL

## 2022-06-29 RX ORDER — ONDANSETRON 2 MG/ML
INJECTION INTRAMUSCULAR; INTRAVENOUS AS NEEDED
Status: DISCONTINUED | OUTPATIENT
Start: 2022-06-29 | End: 2022-06-29 | Stop reason: SURG

## 2022-06-29 RX ORDER — PHENYLEPHRINE HCL IN 0.9% NACL 1 MG/10 ML
SYRINGE (ML) INTRAVENOUS AS NEEDED
Status: DISCONTINUED | OUTPATIENT
Start: 2022-06-29 | End: 2022-06-29 | Stop reason: SURG

## 2022-06-29 RX ORDER — NORTRIPTYLINE HYDROCHLORIDE 25 MG/1
75 CAPSULE ORAL NIGHTLY
Status: DISCONTINUED | OUTPATIENT
Start: 2022-06-29 | End: 2022-06-30 | Stop reason: HOSPADM

## 2022-06-29 RX ORDER — FENTANYL CITRATE 50 UG/ML
50 INJECTION, SOLUTION INTRAMUSCULAR; INTRAVENOUS
Status: DISCONTINUED | OUTPATIENT
Start: 2022-06-29 | End: 2022-06-29 | Stop reason: HOSPADM

## 2022-06-29 RX ORDER — IPRATROPIUM BROMIDE AND ALBUTEROL SULFATE 2.5; .5 MG/3ML; MG/3ML
3 SOLUTION RESPIRATORY (INHALATION) ONCE AS NEEDED
Status: DISCONTINUED | OUTPATIENT
Start: 2022-06-29 | End: 2022-06-29 | Stop reason: HOSPADM

## 2022-06-29 RX ORDER — DIPHENHYDRAMINE HYDROCHLORIDE 50 MG/ML
25 INJECTION INTRAMUSCULAR; INTRAVENOUS NIGHTLY PRN
Status: DISCONTINUED | OUTPATIENT
Start: 2022-06-29 | End: 2022-06-30 | Stop reason: HOSPADM

## 2022-06-29 RX ORDER — CLINDAMYCIN PHOSPHATE 900 MG/50ML
900 INJECTION, SOLUTION INTRAVENOUS ONCE
Status: COMPLETED | OUTPATIENT
Start: 2022-06-29 | End: 2022-06-29

## 2022-06-29 RX ORDER — GABAPENTIN 600 MG/1
600 TABLET ORAL 3 TIMES DAILY
Status: DISCONTINUED | OUTPATIENT
Start: 2022-06-29 | End: 2022-06-30 | Stop reason: HOSPADM

## 2022-06-29 RX ORDER — ONDANSETRON 2 MG/ML
4 INJECTION INTRAMUSCULAR; INTRAVENOUS EVERY 6 HOURS PRN
Status: DISCONTINUED | OUTPATIENT
Start: 2022-06-29 | End: 2022-06-30 | Stop reason: HOSPADM

## 2022-06-29 RX ORDER — FENTANYL CITRATE 50 UG/ML
25 INJECTION, SOLUTION INTRAMUSCULAR; INTRAVENOUS
Status: DISCONTINUED | OUTPATIENT
Start: 2022-06-29 | End: 2022-06-29 | Stop reason: HOSPADM

## 2022-06-29 RX ORDER — BUPIVACAINE HYDROCHLORIDE 5 MG/ML
INJECTION, SOLUTION EPIDURAL; INTRACAUDAL
Status: COMPLETED | OUTPATIENT
Start: 2022-06-29 | End: 2022-06-29

## 2022-06-29 RX ORDER — PROMETHAZINE HYDROCHLORIDE 12.5 MG/1
12.5 TABLET ORAL EVERY 6 HOURS PRN
Qty: 21 TABLET | Refills: 1 | Status: ON HOLD | OUTPATIENT
Start: 2022-06-29 | End: 2022-07-02

## 2022-06-29 RX ORDER — LOSARTAN POTASSIUM 25 MG/1
25 TABLET ORAL
Status: DISCONTINUED | OUTPATIENT
Start: 2022-06-30 | End: 2022-06-30 | Stop reason: HOSPADM

## 2022-06-29 RX ORDER — MIDAZOLAM HYDROCHLORIDE 1 MG/ML
0.5 INJECTION INTRAMUSCULAR; INTRAVENOUS
Status: DISCONTINUED | OUTPATIENT
Start: 2022-06-29 | End: 2022-06-29 | Stop reason: HOSPADM

## 2022-06-29 RX ORDER — SODIUM CHLORIDE, SODIUM LACTATE, POTASSIUM CHLORIDE, CALCIUM CHLORIDE 600; 310; 30; 20 MG/100ML; MG/100ML; MG/100ML; MG/100ML
100 INJECTION, SOLUTION INTRAVENOUS CONTINUOUS
Status: DISCONTINUED | OUTPATIENT
Start: 2022-06-29 | End: 2022-06-30 | Stop reason: HOSPADM

## 2022-06-29 RX ORDER — TRAMADOL HYDROCHLORIDE 50 MG/1
50 TABLET ORAL EVERY 6 HOURS PRN
Status: DISCONTINUED | OUTPATIENT
Start: 2022-06-29 | End: 2022-06-30 | Stop reason: HOSPADM

## 2022-06-29 RX ORDER — ONDANSETRON 4 MG/1
4 TABLET, FILM COATED ORAL EVERY 6 HOURS PRN
Status: DISCONTINUED | OUTPATIENT
Start: 2022-06-29 | End: 2022-06-30 | Stop reason: HOSPADM

## 2022-06-29 RX ORDER — NALOXONE HCL 0.4 MG/ML
0.4 VIAL (ML) INJECTION
Status: DISCONTINUED | OUTPATIENT
Start: 2022-06-29 | End: 2022-06-30 | Stop reason: HOSPADM

## 2022-06-29 RX ORDER — ROCURONIUM BROMIDE 10 MG/ML
INJECTION, SOLUTION INTRAVENOUS AS NEEDED
Status: DISCONTINUED | OUTPATIENT
Start: 2022-06-29 | End: 2022-06-29 | Stop reason: SURG

## 2022-06-29 RX ORDER — FLUMAZENIL 0.1 MG/ML
0.1 INJECTION INTRAVENOUS AS NEEDED
Status: DISCONTINUED | OUTPATIENT
Start: 2022-06-29 | End: 2022-06-29 | Stop reason: HOSPADM

## 2022-06-29 RX ORDER — ONDANSETRON 2 MG/ML
4 INJECTION INTRAMUSCULAR; INTRAVENOUS ONCE AS NEEDED
Status: DISCONTINUED | OUTPATIENT
Start: 2022-06-29 | End: 2022-06-29 | Stop reason: HOSPADM

## 2022-06-29 RX ORDER — CETIRIZINE HYDROCHLORIDE 10 MG/1
10 TABLET ORAL DAILY
Status: DISCONTINUED | OUTPATIENT
Start: 2022-06-30 | End: 2022-06-30 | Stop reason: HOSPADM

## 2022-06-29 RX ORDER — BISACODYL 10 MG
10 SUPPOSITORY, RECTAL RECTAL DAILY PRN
Status: DISCONTINUED | OUTPATIENT
Start: 2022-06-29 | End: 2022-06-30 | Stop reason: HOSPADM

## 2022-06-29 RX ORDER — MELOXICAM 15 MG/1
15 TABLET ORAL DAILY
Status: DISCONTINUED | OUTPATIENT
Start: 2022-06-29 | End: 2022-06-30 | Stop reason: HOSPADM

## 2022-06-29 RX ORDER — METFORMIN HYDROCHLORIDE 500 MG/1
1000 TABLET, EXTENDED RELEASE ORAL 2 TIMES DAILY
Status: DISCONTINUED | OUTPATIENT
Start: 2022-06-29 | End: 2022-06-30 | Stop reason: HOSPADM

## 2022-06-29 RX ORDER — SODIUM CHLORIDE 0.9 % (FLUSH) 0.9 %
10 SYRINGE (ML) INJECTION AS NEEDED
Status: DISCONTINUED | OUTPATIENT
Start: 2022-06-29 | End: 2022-06-29 | Stop reason: HOSPADM

## 2022-06-29 RX ORDER — ACETAMINOPHEN 650 MG/1
650 SUPPOSITORY RECTAL EVERY 4 HOURS PRN
Status: DISCONTINUED | OUTPATIENT
Start: 2022-06-29 | End: 2022-06-30 | Stop reason: HOSPADM

## 2022-06-29 RX ORDER — PREGABALIN 75 MG/1
150 CAPSULE ORAL ONCE
Status: DISCONTINUED | OUTPATIENT
Start: 2022-06-29 | End: 2022-06-29

## 2022-06-29 RX ORDER — NALOXONE HCL 0.4 MG/ML
0.4 VIAL (ML) INJECTION AS NEEDED
Status: DISCONTINUED | OUTPATIENT
Start: 2022-06-29 | End: 2022-06-29 | Stop reason: HOSPADM

## 2022-06-29 RX ORDER — LABETALOL HYDROCHLORIDE 5 MG/ML
5 INJECTION, SOLUTION INTRAVENOUS
Status: DISCONTINUED | OUTPATIENT
Start: 2022-06-29 | End: 2022-06-29 | Stop reason: HOSPADM

## 2022-06-29 RX ORDER — SODIUM CHLORIDE 0.9 % (FLUSH) 0.9 %
10 SYRINGE (ML) INJECTION EVERY 12 HOURS SCHEDULED
Status: DISCONTINUED | OUTPATIENT
Start: 2022-06-29 | End: 2022-06-29 | Stop reason: HOSPADM

## 2022-06-29 RX ORDER — OXYCODONE HYDROCHLORIDE 5 MG/1
10 TABLET ORAL EVERY 4 HOURS PRN
Status: DISCONTINUED | OUTPATIENT
Start: 2022-06-29 | End: 2022-06-30 | Stop reason: HOSPADM

## 2022-06-29 RX ORDER — MIDAZOLAM HYDROCHLORIDE 1 MG/ML
INJECTION INTRAMUSCULAR; INTRAVENOUS
Status: COMPLETED | OUTPATIENT
Start: 2022-06-29 | End: 2022-06-29

## 2022-06-29 RX ORDER — ACETAMINOPHEN 325 MG/1
650 TABLET ORAL EVERY 4 HOURS PRN
Status: DISCONTINUED | OUTPATIENT
Start: 2022-06-29 | End: 2022-06-30 | Stop reason: HOSPADM

## 2022-06-29 RX ORDER — HYDRALAZINE HYDROCHLORIDE 25 MG/1
100 TABLET, FILM COATED ORAL 4 TIMES DAILY
Status: DISCONTINUED | OUTPATIENT
Start: 2022-06-29 | End: 2022-06-30 | Stop reason: HOSPADM

## 2022-06-29 RX ORDER — CLINDAMYCIN PHOSPHATE 900 MG/50ML
900 INJECTION, SOLUTION INTRAVENOUS EVERY 8 HOURS
Status: DISCONTINUED | OUTPATIENT
Start: 2022-06-29 | End: 2022-06-30 | Stop reason: HOSPADM

## 2022-06-29 RX ORDER — MIDAZOLAM HYDROCHLORIDE 1 MG/ML
1 INJECTION INTRAMUSCULAR; INTRAVENOUS
Status: DISCONTINUED | OUTPATIENT
Start: 2022-06-29 | End: 2022-06-29 | Stop reason: HOSPADM

## 2022-06-29 RX ORDER — FUROSEMIDE 20 MG/1
20 TABLET ORAL DAILY
Status: DISCONTINUED | OUTPATIENT
Start: 2022-06-30 | End: 2022-06-30 | Stop reason: HOSPADM

## 2022-06-29 RX ORDER — OXYCODONE HCL 10 MG/1
10 TABLET, FILM COATED, EXTENDED RELEASE ORAL ONCE
Status: DISCONTINUED | OUTPATIENT
Start: 2022-06-29 | End: 2022-06-29

## 2022-06-29 RX ORDER — TRANEXAMIC ACID 10 MG/ML
1000 INJECTION, SOLUTION INTRAVENOUS ONCE
Status: DISCONTINUED | OUTPATIENT
Start: 2022-06-29 | End: 2022-06-29 | Stop reason: HOSPADM

## 2022-06-29 RX ORDER — PANTOPRAZOLE SODIUM 40 MG/1
40 TABLET, DELAYED RELEASE ORAL EVERY MORNING
Status: DISCONTINUED | OUTPATIENT
Start: 2022-06-30 | End: 2022-06-30 | Stop reason: HOSPADM

## 2022-06-29 RX ORDER — GLIPIZIDE 5 MG/1
1.25 TABLET ORAL
Status: DISCONTINUED | OUTPATIENT
Start: 2022-06-29 | End: 2022-06-30 | Stop reason: HOSPADM

## 2022-06-29 RX ORDER — MONTELUKAST SODIUM 10 MG/1
10 TABLET ORAL DAILY
Status: DISCONTINUED | OUTPATIENT
Start: 2022-06-29 | End: 2022-06-30 | Stop reason: HOSPADM

## 2022-06-29 RX ORDER — MELOXICAM 15 MG/1
15 TABLET ORAL ONCE
Status: COMPLETED | OUTPATIENT
Start: 2022-06-29 | End: 2022-06-29

## 2022-06-29 RX ORDER — PROPOFOL 10 MG/ML
VIAL (ML) INTRAVENOUS AS NEEDED
Status: DISCONTINUED | OUTPATIENT
Start: 2022-06-29 | End: 2022-06-29 | Stop reason: SURG

## 2022-06-29 RX ORDER — FENTANYL CITRATE 50 UG/ML
INJECTION, SOLUTION INTRAMUSCULAR; INTRAVENOUS AS NEEDED
Status: DISCONTINUED | OUTPATIENT
Start: 2022-06-29 | End: 2022-06-29 | Stop reason: SURG

## 2022-06-29 RX ORDER — LIDOCAINE HYDROCHLORIDE 10 MG/ML
INJECTION, SOLUTION EPIDURAL; INFILTRATION; INTRACAUDAL; PERINEURAL AS NEEDED
Status: DISCONTINUED | OUTPATIENT
Start: 2022-06-29 | End: 2022-06-29 | Stop reason: SURG

## 2022-06-29 RX ORDER — SODIUM CHLORIDE, SODIUM LACTATE, POTASSIUM CHLORIDE, CALCIUM CHLORIDE 600; 310; 30; 20 MG/100ML; MG/100ML; MG/100ML; MG/100ML
9 INJECTION, SOLUTION INTRAVENOUS CONTINUOUS PRN
Status: DISCONTINUED | OUTPATIENT
Start: 2022-06-29 | End: 2022-06-29 | Stop reason: HOSPADM

## 2022-06-29 RX ORDER — DEXAMETHASONE SODIUM PHOSPHATE 4 MG/ML
INJECTION, SOLUTION INTRA-ARTICULAR; INTRALESIONAL; INTRAMUSCULAR; INTRAVENOUS; SOFT TISSUE
Status: COMPLETED | OUTPATIENT
Start: 2022-06-29 | End: 2022-06-29

## 2022-06-29 RX ORDER — DIPHENHYDRAMINE HCL 25 MG
25 CAPSULE ORAL EVERY 6 HOURS PRN
Status: DISCONTINUED | OUTPATIENT
Start: 2022-06-29 | End: 2022-06-30 | Stop reason: HOSPADM

## 2022-06-29 RX ORDER — DIPHENHYDRAMINE HCL 25 MG
25 CAPSULE ORAL NIGHTLY PRN
Status: DISCONTINUED | OUTPATIENT
Start: 2022-06-29 | End: 2022-06-30 | Stop reason: HOSPADM

## 2022-06-29 RX ORDER — HYDROCODONE BITARTRATE AND ACETAMINOPHEN 7.5; 325 MG/1; MG/1
1 TABLET ORAL EVERY 6 HOURS PRN
Qty: 28 TABLET | Refills: 0 | Status: ON HOLD | OUTPATIENT
Start: 2022-06-29 | End: 2022-07-02

## 2022-06-29 RX ORDER — SPIRONOLACTONE 25 MG/1
50 TABLET ORAL DAILY
Status: DISCONTINUED | OUTPATIENT
Start: 2022-06-30 | End: 2022-06-30 | Stop reason: HOSPADM

## 2022-06-29 RX ORDER — SODIUM CHLORIDE 9 MG/ML
75 INJECTION, SOLUTION INTRAVENOUS CONTINUOUS
Status: DISCONTINUED | OUTPATIENT
Start: 2022-06-29 | End: 2022-06-30 | Stop reason: HOSPADM

## 2022-06-29 RX ORDER — NITROGLYCERIN 0.4 MG/1
0.4 TABLET SUBLINGUAL
Status: DISCONTINUED | OUTPATIENT
Start: 2022-06-29 | End: 2022-06-30 | Stop reason: HOSPADM

## 2022-06-29 RX ORDER — ASPIRIN 325 MG
325 TABLET, DELAYED RELEASE (ENTERIC COATED) ORAL EVERY 12 HOURS SCHEDULED
Status: DISCONTINUED | OUTPATIENT
Start: 2022-06-30 | End: 2022-06-30 | Stop reason: HOSPADM

## 2022-06-29 RX ADMIN — PROPOFOL 125 MCG/KG/MIN: 10 INJECTION, EMULSION INTRAVENOUS at 13:01

## 2022-06-29 RX ADMIN — METFORMIN HYDROCHLORIDE 1000 MG: 500 TABLET, EXTENDED RELEASE ORAL at 22:28

## 2022-06-29 RX ADMIN — TRAMADOL HYDROCHLORIDE 50 MG: 50 TABLET, COATED ORAL at 22:33

## 2022-06-29 RX ADMIN — MIDAZOLAM 2 MG: 1 INJECTION INTRAMUSCULAR; INTRAVENOUS at 11:01

## 2022-06-29 RX ADMIN — Medication 100 MCG: at 13:08

## 2022-06-29 RX ADMIN — PROPOFOL 150 MG: 10 INJECTION, EMULSION INTRAVENOUS at 12:58

## 2022-06-29 RX ADMIN — Medication 100 MCG: at 13:20

## 2022-06-29 RX ADMIN — MELOXICAM 15 MG: 15 TABLET ORAL at 10:35

## 2022-06-29 RX ADMIN — GLIPIZIDE 1.25 MG: 5 TABLET ORAL at 22:28

## 2022-06-29 RX ADMIN — GABAPENTIN 600 MG: 600 TABLET, FILM COATED ORAL at 22:28

## 2022-06-29 RX ADMIN — Medication 100 MCG: at 13:41

## 2022-06-29 RX ADMIN — LIDOCAINE HYDROCHLORIDE 50 MG: 10 INJECTION, SOLUTION EPIDURAL; INFILTRATION; INTRACAUDAL; PERINEURAL at 12:58

## 2022-06-29 RX ADMIN — NORTRIPTYLINE HYDROCHLORIDE 75 MG: 25 CAPSULE ORAL at 22:33

## 2022-06-29 RX ADMIN — INSULIN HUMAN 8 UNITS: 100 INJECTION, SOLUTION PARENTERAL at 11:25

## 2022-06-29 RX ADMIN — BUPIVACAINE 10 ML: 13.3 INJECTION, SUSPENSION, LIPOSOMAL INFILTRATION at 11:01

## 2022-06-29 RX ADMIN — SODIUM CHLORIDE 75 ML/HR: 9 INJECTION, SOLUTION INTRAVENOUS at 22:50

## 2022-06-29 RX ADMIN — MORPHINE SULFATE 2 MG: 4 INJECTION INTRAVENOUS at 22:44

## 2022-06-29 RX ADMIN — FENTANYL CITRATE 100 MCG: 50 INJECTION, SOLUTION INTRAMUSCULAR; INTRAVENOUS at 12:58

## 2022-06-29 RX ADMIN — MELOXICAM 15 MG: 15 TABLET ORAL at 19:17

## 2022-06-29 RX ADMIN — ONDANSETRON 4 MG: 2 INJECTION INTRAMUSCULAR; INTRAVENOUS at 14:13

## 2022-06-29 RX ADMIN — CLINDAMYCIN PHOSPHATE 900 MG: 900 INJECTION, SOLUTION INTRAVENOUS at 13:01

## 2022-06-29 RX ADMIN — MONTELUKAST 10 MG: 10 TABLET, FILM COATED ORAL at 22:28

## 2022-06-29 RX ADMIN — ROCURONIUM BROMIDE 50 MG: 50 INJECTION, SOLUTION INTRAVENOUS at 12:58

## 2022-06-29 RX ADMIN — DEXAMETHASONE SODIUM PHOSPHATE 4 MG: 4 INJECTION, SOLUTION INTRAMUSCULAR; INTRAVENOUS at 11:01

## 2022-06-29 RX ADMIN — CLINDAMYCIN PHOSPHATE 900 MG: 900 INJECTION, SOLUTION INTRAVENOUS at 22:28

## 2022-06-29 RX ADMIN — BUPIVACAINE HYDROCHLORIDE 10 ML: 5 INJECTION, SOLUTION EPIDURAL; INTRACAUDAL; PERINEURAL at 11:01

## 2022-06-29 RX ADMIN — ACETAMINOPHEN 650 MG: 325 TABLET ORAL at 19:17

## 2022-06-29 RX ADMIN — BENZOYL PEROXIDE: 10 SUSPENSION TOPICAL at 10:06

## 2022-06-29 RX ADMIN — SODIUM CHLORIDE, POTASSIUM CHLORIDE, SODIUM LACTATE AND CALCIUM CHLORIDE 9 ML/HR: 600; 310; 30; 20 INJECTION, SOLUTION INTRAVENOUS at 10:47

## 2022-06-29 RX ADMIN — HYDRALAZINE HYDROCHLORIDE 100 MG: 25 TABLET, FILM COATED ORAL at 22:28

## 2022-06-29 RX ADMIN — ROCURONIUM BROMIDE 20 MG: 50 INJECTION, SOLUTION INTRAVENOUS at 13:41

## 2022-06-29 RX ADMIN — DEXAMETHASONE SODIUM PHOSPHATE 4 MG: 4 INJECTION, SOLUTION INTRAMUSCULAR; INTRAVENOUS at 13:08

## 2022-06-29 NOTE — ANESTHESIA PREPROCEDURE EVALUATION
Anesthesia Evaluation     Patient summary reviewed and Nursing notes reviewed   NPO Solid Status: > 8 hours  NPO Liquid Status: > 8 hours           Airway   Mallampati: II  TM distance: >3 FB  Neck ROM: full  No difficulty expected  Dental - normal exam     Pulmonary - normal exam   (+) sleep apnea,   Cardiovascular - normal exam    (+) hypertension, valvular problems/murmurs,       Neuro/Psych  (+) headaches,    GI/Hepatic/Renal/Endo    (+) obesity,  GERD,  diabetes mellitus,     Musculoskeletal     Abdominal  - normal exam    Bowel sounds: normal.   Substance History      OB/GYN          Other   arthritis,                      Anesthesia Plan    ASA 3     general with block     intravenous induction     Anesthetic plan, risks, benefits, and alternatives have been provided, discussed and informed consent has been obtained with: patient.        CODE STATUS:

## 2022-06-29 NOTE — ANESTHESIA PROCEDURE NOTES
Peripheral Block      Patient reassessed immediately prior to procedure    Patient location during procedure: OR  Start time: 6/29/2022 10:52 AM  Stop time: 6/29/2022 11:02 AM  Reason for block: at surgeon's request and post-op pain management  Performed by  Anesthesiologist: Salvador Perez MD  Assisted by: Kyaw Gonzalez RN  Preanesthetic Checklist  Completed: patient identified, IV checked, site marked, risks and benefits discussed, surgical consent, monitors and equipment checked, pre-op evaluation and timeout performed  Prep:  Pt Position: supine  Sterile barriers:cap, gloves, mask and alcohol skin prep  Prep: ChloraPrep  Patient monitoring: blood pressure monitoring, continuous pulse oximetry and EKG  Procedure    Sedation: yes  Performed under: local infiltration  Guidance:ultrasound guided    ULTRASOUND INTERPRETATION.  Using ultrasound guidance a 20 G gauge needle was placed in close proximity to the brachial plexus nerve, at which point, under ultrasound guidance anesthetic was injected in the area of the nerve and spread of the anesthesia was seen on ultrasound in close proximity thereto.  There were no abnormalities seen on ultrasound; a digital image was taken; and the patient tolerated the procedure with no complications. Images:still images obtained, printed/placed on chart    Laterality:right  Block Type:interscalene  Injection Technique:single-shot  Needle Type:echogenic  Needle Gauge:20 G  Resistance on Injection: none  Sedation medications used: midazolam (VERSED) injection, 2 mg  Medications Used: dexamethasone (DECADRON) injection, 4 mg  bupivacaine liposome (EXPAREL) injection 1.3%, 10 mL  bupivacaine PF (MARCAINE) injection 0.5%, 10 mL  Med administered at 6/29/2022 11:01 AM      Post Assessment  Injection Assessment: negative aspiration for heme, no paresthesia on injection and incremental injection  Patient Tolerance:comfortable throughout block  Complications:no

## 2022-06-29 NOTE — ANESTHESIA POSTPROCEDURE EVALUATION
Patient: Ramona Luis    Procedure Summary     Date: 06/29/22 Room / Location: [facility] OR [address] [place] [address] MAIN OR    Anesthesia Start: 1251 Anesthesia Stop: 1441    Procedure: TOTAL SHOULDER REVERSE ARTHROPLASTY (Right Shoulder) Diagnosis:       Osteoarthritis of right glenohumeral joint      (Osteoarthritis of right glenohumeral joint [M19.011])    Surgeons: Connor Briceno MD Provider: Salvador Perez MD    Anesthesia Type: general with block ASA Status: 3          Anesthesia Type: general with block    Vitals  Vitals Value Taken Time   /61 06/29/22 1542   Temp 96.7 °F (35.9 °C) 06/29/22 1441   Pulse 58 06/29/22 1542   Resp 16 06/29/22 1525   SpO2 97 % 06/29/22 1542   Vitals shown include unvalidated device data.        Post Anesthesia Care and Evaluation    Patient location during evaluation: PACU  Patient participation: complete - patient participated  Level of consciousness: awake  Pain scale: See nurse's notes for pain score.  Pain management: adequate    Airway patency: patent  Anesthetic complications: No anesthetic complications  PONV Status: none  Cardiovascular status: acceptable  Respiratory status: acceptable  Hydration status: acceptable    Comments: Patient seen and examined postoperatively; vital signs stable; SpO2 greater than or equal to 90%; cardiopulmonary status stable; nausea/vomiting adequately controlled; pain adequately controlled; no apparent anesthesia complications; patient discharged from anesthesia care when discharge criteria were met

## 2022-06-29 NOTE — ANESTHESIA PROCEDURE NOTES
Airway  Urgency: elective    Date/Time: 6/29/2022 1:01 PM  Airway not difficult    General Information and Staff    Patient location during procedure: OR  Anesthesiologist: Salvador Perez MD CRNA/LANDON: Mauricio Sullivan CAA    Indications and Patient Condition  Indications for airway management: airway protection    Preoxygenated: yes  Mask difficulty assessment: 1 - vent by mask    Final Airway Details  Final airway type: endotracheal airway      Successful airway: ETT  Cuffed: yes   Successful intubation technique: direct laryngoscopy  Facilitating devices/methods: intubating stylet  Endotracheal tube insertion site: oral  Blade: Lorna  Blade size: 3  ETT size (mm): 7.5  Cormack-Lehane Classification: grade I - full view of glottis  Placement verified by: chest auscultation and capnometry   Measured from: lips  ETT/EBT  to lips (cm): 21  Number of attempts at approach: 1  Assessment: lips, teeth, and gum same as pre-op and atraumatic intubation

## 2022-06-30 ENCOUNTER — HOME HEALTH ADMISSION (OUTPATIENT)
Dept: HOME HEALTH SERVICES | Facility: HOME HEALTHCARE | Age: 72
End: 2022-06-30

## 2022-06-30 VITALS
HEIGHT: 58 IN | SYSTOLIC BLOOD PRESSURE: 145 MMHG | DIASTOLIC BLOOD PRESSURE: 69 MMHG | WEIGHT: 181.6 LBS | HEART RATE: 65 BPM | TEMPERATURE: 98 F | RESPIRATION RATE: 12 BRPM | BODY MASS INDEX: 38.12 KG/M2 | OXYGEN SATURATION: 96 %

## 2022-06-30 LAB
ANION GAP SERPL CALCULATED.3IONS-SCNC: 11 MMOL/L (ref 5–15)
BUN SERPL-MCNC: 18 MG/DL (ref 8–23)
BUN/CREAT SERPL: 14.3 (ref 7–25)
CALCIUM SPEC-SCNC: 8.1 MG/DL (ref 8.6–10.5)
CHLORIDE SERPL-SCNC: 88 MMOL/L (ref 98–107)
CO2 SERPL-SCNC: 19 MMOL/L (ref 22–29)
CREAT SERPL-MCNC: 1.26 MG/DL (ref 0.57–1)
EGFRCR SERPLBLD CKD-EPI 2021: 45.2 ML/MIN/1.73
GLUCOSE BLDC GLUCOMTR-MCNC: 313 MG/DL (ref 70–105)
GLUCOSE SERPL-MCNC: 311 MG/DL (ref 65–99)
HCT VFR BLD AUTO: 29.6 % (ref 34–46.6)
HGB BLD-MCNC: 9.8 G/DL (ref 12–15.9)
POTASSIUM SERPL-SCNC: 6.3 MMOL/L (ref 3.5–5.2)
SODIUM SERPL-SCNC: 118 MMOL/L (ref 136–145)

## 2022-06-30 PROCEDURE — 63710000001 SPIRONOLACTONE 25 MG TABLET: Performed by: ORTHOPAEDIC SURGERY

## 2022-06-30 PROCEDURE — 93005 ELECTROCARDIOGRAM TRACING: CPT | Performed by: NURSE PRACTITIONER

## 2022-06-30 PROCEDURE — 97162 PT EVAL MOD COMPLEX 30 MIN: CPT

## 2022-06-30 PROCEDURE — G0378 HOSPITAL OBSERVATION PER HR: HCPCS

## 2022-06-30 PROCEDURE — A9270 NON-COVERED ITEM OR SERVICE: HCPCS | Performed by: ORTHOPAEDIC SURGERY

## 2022-06-30 PROCEDURE — 80048 BASIC METABOLIC PNL TOTAL CA: CPT | Performed by: ORTHOPAEDIC SURGERY

## 2022-06-30 PROCEDURE — 99214 OFFICE O/P EST MOD 30 MIN: CPT | Performed by: HOSPITALIST

## 2022-06-30 PROCEDURE — 63710000001 MONTELUKAST 10 MG TABLET: Performed by: ORTHOPAEDIC SURGERY

## 2022-06-30 PROCEDURE — 97110 THERAPEUTIC EXERCISES: CPT

## 2022-06-30 PROCEDURE — 63710000001 METFORMIN ER 500 MG TABLET SUSTAINED-RELEASE 24 HOUR: Performed by: ORTHOPAEDIC SURGERY

## 2022-06-30 PROCEDURE — 63710000001 SODIUM ZIRCONIUM CYCLOSILICATE 10 G PACK: Performed by: NURSE PRACTITIONER

## 2022-06-30 PROCEDURE — 63710000001 ASPIRIN EC 325 MG TABLET DELAYED-RELEASE: Performed by: ORTHOPAEDIC SURGERY

## 2022-06-30 PROCEDURE — A9270 NON-COVERED ITEM OR SERVICE: HCPCS | Performed by: NURSE PRACTITIONER

## 2022-06-30 PROCEDURE — 63710000001 GLIPIZIDE 5 MG TABLET: Performed by: ORTHOPAEDIC SURGERY

## 2022-06-30 PROCEDURE — 63710000001 CETIRIZINE 10 MG TABLET: Performed by: ORTHOPAEDIC SURGERY

## 2022-06-30 PROCEDURE — 93010 ELECTROCARDIOGRAM REPORT: CPT | Performed by: INTERNAL MEDICINE

## 2022-06-30 PROCEDURE — 63710000001 GABAPENTIN 600 MG TABLET: Performed by: ORTHOPAEDIC SURGERY

## 2022-06-30 PROCEDURE — 63710000001 FUROSEMIDE 20 MG TABLET: Performed by: ORTHOPAEDIC SURGERY

## 2022-06-30 PROCEDURE — 63710000001 ACETAMINOPHEN 325 MG TABLET: Performed by: ORTHOPAEDIC SURGERY

## 2022-06-30 PROCEDURE — 25010000002 ONDANSETRON PER 1 MG: Performed by: ORTHOPAEDIC SURGERY

## 2022-06-30 PROCEDURE — 97535 SELF CARE MNGMENT TRAINING: CPT

## 2022-06-30 PROCEDURE — 63710000001 LOSARTAN 25 MG TABLET: Performed by: ORTHOPAEDIC SURGERY

## 2022-06-30 PROCEDURE — 63710000001 PANTOPRAZOLE 40 MG TABLET DELAYED-RELEASE: Performed by: ORTHOPAEDIC SURGERY

## 2022-06-30 PROCEDURE — 82962 GLUCOSE BLOOD TEST: CPT

## 2022-06-30 PROCEDURE — 63710000001 HYDRALAZINE 25 MG TABLET: Performed by: ORTHOPAEDIC SURGERY

## 2022-06-30 PROCEDURE — 85018 HEMOGLOBIN: CPT | Performed by: ORTHOPAEDIC SURGERY

## 2022-06-30 PROCEDURE — 63710000001 MELOXICAM 15 MG TABLET: Performed by: ORTHOPAEDIC SURGERY

## 2022-06-30 PROCEDURE — 85014 HEMATOCRIT: CPT | Performed by: ORTHOPAEDIC SURGERY

## 2022-06-30 RX ORDER — IPRATROPIUM BROMIDE AND ALBUTEROL SULFATE 2.5; .5 MG/3ML; MG/3ML
3 SOLUTION RESPIRATORY (INHALATION) ONCE
Status: DISCONTINUED | OUTPATIENT
Start: 2022-06-30 | End: 2022-06-30 | Stop reason: HOSPADM

## 2022-06-30 RX ORDER — SODIUM CHLORIDE 9 MG/ML
75 INJECTION, SOLUTION INTRAVENOUS CONTINUOUS
Status: DISCONTINUED | OUTPATIENT
Start: 2022-06-30 | End: 2022-06-30 | Stop reason: HOSPADM

## 2022-06-30 RX ADMIN — ONDANSETRON 4 MG: 2 INJECTION INTRAMUSCULAR; INTRAVENOUS at 05:52

## 2022-06-30 RX ADMIN — GLIPIZIDE 1.25 MG: 5 TABLET ORAL at 05:33

## 2022-06-30 RX ADMIN — MONTELUKAST 10 MG: 10 TABLET, FILM COATED ORAL at 08:21

## 2022-06-30 RX ADMIN — LOSARTAN POTASSIUM 25 MG: 25 TABLET, FILM COATED ORAL at 08:22

## 2022-06-30 RX ADMIN — GABAPENTIN 600 MG: 600 TABLET, FILM COATED ORAL at 08:21

## 2022-06-30 RX ADMIN — ASPIRIN 325 MG: 325 TABLET, COATED ORAL at 08:21

## 2022-06-30 RX ADMIN — CETIRIZINE HYDROCHLORIDE 10 MG: 10 TABLET, FILM COATED ORAL at 08:21

## 2022-06-30 RX ADMIN — SODIUM ZIRCONIUM CYCLOSILICATE 10 G: 10 POWDER, FOR SUSPENSION ORAL at 08:22

## 2022-06-30 RX ADMIN — MELOXICAM 15 MG: 15 TABLET ORAL at 08:22

## 2022-06-30 RX ADMIN — CLINDAMYCIN PHOSPHATE 900 MG: 900 INJECTION, SOLUTION INTRAVENOUS at 05:30

## 2022-06-30 RX ADMIN — ONDANSETRON 4 MG: 2 INJECTION INTRAMUSCULAR; INTRAVENOUS at 11:30

## 2022-06-30 RX ADMIN — METFORMIN HYDROCHLORIDE 1000 MG: 500 TABLET, EXTENDED RELEASE ORAL at 08:22

## 2022-06-30 RX ADMIN — SPIRONOLACTONE 50 MG: 25 TABLET ORAL at 08:21

## 2022-06-30 RX ADMIN — ACETAMINOPHEN 650 MG: 325 TABLET ORAL at 08:22

## 2022-06-30 RX ADMIN — PANTOPRAZOLE SODIUM 40 MG: 40 TABLET, DELAYED RELEASE ORAL at 05:33

## 2022-06-30 RX ADMIN — FUROSEMIDE 20 MG: 20 TABLET ORAL at 08:22

## 2022-06-30 RX ADMIN — HYDRALAZINE HYDROCHLORIDE 100 MG: 25 TABLET, FILM COATED ORAL at 08:22

## 2022-07-01 ENCOUNTER — APPOINTMENT (OUTPATIENT)
Dept: CT IMAGING | Facility: HOSPITAL | Age: 72
End: 2022-07-01

## 2022-07-01 ENCOUNTER — APPOINTMENT (OUTPATIENT)
Dept: GENERAL RADIOLOGY | Facility: HOSPITAL | Age: 72
End: 2022-07-01

## 2022-07-01 ENCOUNTER — HOME CARE VISIT (OUTPATIENT)
Dept: HOME HEALTH SERVICES | Facility: HOME HEALTHCARE | Age: 72
End: 2022-07-01

## 2022-07-01 ENCOUNTER — HOSPITAL ENCOUNTER (INPATIENT)
Facility: HOSPITAL | Age: 72
LOS: 2 days | Discharge: HOME HEALTH | End: 2022-07-03
Attending: EMERGENCY MEDICINE | Admitting: INTERNAL MEDICINE

## 2022-07-01 ENCOUNTER — TELEPHONE (OUTPATIENT)
Dept: ORTHOPEDIC SURGERY | Facility: CLINIC | Age: 72
End: 2022-07-01

## 2022-07-01 DIAGNOSIS — N28.9 ACUTE RENAL DISEASE: ICD-10-CM

## 2022-07-01 DIAGNOSIS — E87.1 HYPONATREMIA: ICD-10-CM

## 2022-07-01 DIAGNOSIS — J98.6 DIAPHRAGM PARALYSIS: Primary | ICD-10-CM

## 2022-07-01 DIAGNOSIS — R06.02 SHORTNESS OF BREATH: ICD-10-CM

## 2022-07-01 DIAGNOSIS — M19.011 PRIMARY OSTEOARTHRITIS OF RIGHT SHOULDER: ICD-10-CM

## 2022-07-01 LAB
ALBUMIN SERPL-MCNC: 4.1 G/DL (ref 3.5–5.2)
ALBUMIN/GLOB SERPL: 1.6 G/DL
ALP SERPL-CCNC: 59 U/L (ref 39–117)
ALT SERPL W P-5'-P-CCNC: 20 U/L (ref 1–33)
ANION GAP SERPL CALCULATED.3IONS-SCNC: 13 MMOL/L (ref 5–15)
AST SERPL-CCNC: 23 U/L (ref 1–32)
B PARAPERT DNA SPEC QL NAA+PROBE: NOT DETECTED
B PERT DNA SPEC QL NAA+PROBE: NOT DETECTED
BASOPHILS # BLD AUTO: 0.1 10*3/MM3 (ref 0–0.2)
BASOPHILS NFR BLD AUTO: 0.6 % (ref 0–1.5)
BILIRUB SERPL-MCNC: 0.3 MG/DL (ref 0–1.2)
BUN SERPL-MCNC: 25 MG/DL (ref 8–23)
BUN/CREAT SERPL: 17.4 (ref 7–25)
C PNEUM DNA NPH QL NAA+NON-PROBE: NOT DETECTED
CALCIUM SPEC-SCNC: 8.9 MG/DL (ref 8.6–10.5)
CHLORIDE SERPL-SCNC: 89 MMOL/L (ref 98–107)
CO2 SERPL-SCNC: 22 MMOL/L (ref 22–29)
CREAT SERPL-MCNC: 1.44 MG/DL (ref 0.57–1)
DEPRECATED RDW RBC AUTO: 39.8 FL (ref 37–54)
EGFRCR SERPLBLD CKD-EPI 2021: 38.5 ML/MIN/1.73
EOSINOPHIL # BLD AUTO: 0.4 10*3/MM3 (ref 0–0.4)
EOSINOPHIL NFR BLD AUTO: 3.6 % (ref 0.3–6.2)
ERYTHROCYTE [DISTWIDTH] IN BLOOD BY AUTOMATED COUNT: 13.4 % (ref 12.3–15.4)
FLUAV SUBTYP SPEC NAA+PROBE: NOT DETECTED
FLUBV RNA ISLT QL NAA+PROBE: NOT DETECTED
GLOBULIN UR ELPH-MCNC: 2.5 GM/DL
GLUCOSE BLDC GLUCOMTR-MCNC: 153 MG/DL (ref 70–105)
GLUCOSE SERPL-MCNC: 72 MG/DL (ref 65–99)
HADV DNA SPEC NAA+PROBE: NOT DETECTED
HCOV 229E RNA SPEC QL NAA+PROBE: NOT DETECTED
HCOV HKU1 RNA SPEC QL NAA+PROBE: NOT DETECTED
HCOV NL63 RNA SPEC QL NAA+PROBE: NOT DETECTED
HCOV OC43 RNA SPEC QL NAA+PROBE: NOT DETECTED
HCT VFR BLD AUTO: 30.7 % (ref 34–46.6)
HGB BLD-MCNC: 10 G/DL (ref 12–15.9)
HMPV RNA NPH QL NAA+NON-PROBE: NOT DETECTED
HPIV1 RNA ISLT QL NAA+PROBE: NOT DETECTED
HPIV2 RNA SPEC QL NAA+PROBE: NOT DETECTED
HPIV3 RNA NPH QL NAA+PROBE: NOT DETECTED
HPIV4 P GENE NPH QL NAA+PROBE: NOT DETECTED
LYMPHOCYTES # BLD AUTO: 2 10*3/MM3 (ref 0.7–3.1)
LYMPHOCYTES NFR BLD AUTO: 17.7 % (ref 19.6–45.3)
M PNEUMO IGG SER IA-ACNC: NOT DETECTED
MCH RBC QN AUTO: 27.9 PG (ref 26.6–33)
MCHC RBC AUTO-ENTMCNC: 32.5 G/DL (ref 31.5–35.7)
MCV RBC AUTO: 85.7 FL (ref 79–97)
MONOCYTES # BLD AUTO: 0.9 10*3/MM3 (ref 0.1–0.9)
MONOCYTES NFR BLD AUTO: 8.5 % (ref 5–12)
NEUTROPHILS NFR BLD AUTO: 69.6 % (ref 42.7–76)
NEUTROPHILS NFR BLD AUTO: 7.7 10*3/MM3 (ref 1.7–7)
NRBC BLD AUTO-RTO: 0 /100 WBC (ref 0–0.2)
NT-PROBNP SERPL-MCNC: 863.5 PG/ML (ref 0–900)
PLATELET # BLD AUTO: 328 10*3/MM3 (ref 140–450)
PMV BLD AUTO: 6.8 FL (ref 6–12)
POTASSIUM SERPL-SCNC: 5.1 MMOL/L (ref 3.5–5.2)
PROT SERPL-MCNC: 6.6 G/DL (ref 6–8.5)
RBC # BLD AUTO: 3.58 10*6/MM3 (ref 3.77–5.28)
RHINOVIRUS RNA SPEC NAA+PROBE: NOT DETECTED
RSV RNA NPH QL NAA+NON-PROBE: NOT DETECTED
SARS-COV-2 RNA NPH QL NAA+NON-PROBE: NOT DETECTED
SODIUM SERPL-SCNC: 124 MMOL/L (ref 136–145)
TROPONIN T SERPL-MCNC: <0.01 NG/ML (ref 0–0.03)
WBC NRBC COR # BLD: 11.1 10*3/MM3 (ref 3.4–10.8)

## 2022-07-01 PROCEDURE — 94640 AIRWAY INHALATION TREATMENT: CPT

## 2022-07-01 PROCEDURE — 83880 ASSAY OF NATRIURETIC PEPTIDE: CPT | Performed by: EMERGENCY MEDICINE

## 2022-07-01 PROCEDURE — 25010000002 ENOXAPARIN PER 10 MG: Performed by: INTERNAL MEDICINE

## 2022-07-01 PROCEDURE — 84484 ASSAY OF TROPONIN QUANT: CPT | Performed by: EMERGENCY MEDICINE

## 2022-07-01 PROCEDURE — 99284 EMERGENCY DEPT VISIT MOD MDM: CPT

## 2022-07-01 PROCEDURE — 93005 ELECTROCARDIOGRAM TRACING: CPT

## 2022-07-01 PROCEDURE — 99223 1ST HOSP IP/OBS HIGH 75: CPT | Performed by: INTERNAL MEDICINE

## 2022-07-01 PROCEDURE — 94799 UNLISTED PULMONARY SVC/PX: CPT

## 2022-07-01 PROCEDURE — 0 IOPAMIDOL PER 1 ML: Performed by: EMERGENCY MEDICINE

## 2022-07-01 PROCEDURE — 80053 COMPREHEN METABOLIC PANEL: CPT | Performed by: EMERGENCY MEDICINE

## 2022-07-01 PROCEDURE — 71045 X-RAY EXAM CHEST 1 VIEW: CPT

## 2022-07-01 PROCEDURE — 82962 GLUCOSE BLOOD TEST: CPT

## 2022-07-01 PROCEDURE — 94618 PULMONARY STRESS TESTING: CPT

## 2022-07-01 PROCEDURE — 63710000001 INSULIN ISOPHANE & REGULAR PER 5 UNITS: Performed by: INTERNAL MEDICINE

## 2022-07-01 PROCEDURE — 85025 COMPLETE CBC W/AUTO DIFF WBC: CPT | Performed by: EMERGENCY MEDICINE

## 2022-07-01 PROCEDURE — 0202U NFCT DS 22 TRGT SARS-COV-2: CPT | Performed by: EMERGENCY MEDICINE

## 2022-07-01 PROCEDURE — 71275 CT ANGIOGRAPHY CHEST: CPT

## 2022-07-01 RX ORDER — PANTOPRAZOLE SODIUM 40 MG/1
40 TABLET, DELAYED RELEASE ORAL EVERY MORNING
Status: DISCONTINUED | OUTPATIENT
Start: 2022-07-02 | End: 2022-07-03 | Stop reason: HOSPADM

## 2022-07-01 RX ORDER — DICYCLOMINE HYDROCHLORIDE 10 MG/1
10 CAPSULE ORAL 3 TIMES DAILY
Status: DISCONTINUED | OUTPATIENT
Start: 2022-07-01 | End: 2022-07-03 | Stop reason: HOSPADM

## 2022-07-01 RX ORDER — POLYETHYLENE GLYCOL 3350 17 G/17G
17 POWDER, FOR SOLUTION ORAL DAILY
Status: DISCONTINUED | OUTPATIENT
Start: 2022-07-02 | End: 2022-07-03 | Stop reason: HOSPADM

## 2022-07-01 RX ORDER — CLINDAMYCIN PHOSPHATE 300 MG/50ML
300 INJECTION, SOLUTION INTRAVENOUS EVERY 8 HOURS
Status: DISCONTINUED | OUTPATIENT
Start: 2022-07-01 | End: 2022-07-03 | Stop reason: HOSPADM

## 2022-07-01 RX ORDER — HYDRALAZINE HYDROCHLORIDE 25 MG/1
100 TABLET, FILM COATED ORAL 3 TIMES DAILY
Status: DISCONTINUED | OUTPATIENT
Start: 2022-07-01 | End: 2022-07-03 | Stop reason: HOSPADM

## 2022-07-01 RX ORDER — SODIUM CHLORIDE 0.9 % (FLUSH) 0.9 %
10 SYRINGE (ML) INJECTION AS NEEDED
Status: DISCONTINUED | OUTPATIENT
Start: 2022-07-01 | End: 2022-07-03 | Stop reason: HOSPADM

## 2022-07-01 RX ORDER — FUROSEMIDE 20 MG/1
20 TABLET ORAL DAILY
Status: DISCONTINUED | OUTPATIENT
Start: 2022-07-02 | End: 2022-07-02

## 2022-07-01 RX ORDER — IPRATROPIUM BROMIDE AND ALBUTEROL SULFATE 2.5; .5 MG/3ML; MG/3ML
3 SOLUTION RESPIRATORY (INHALATION)
Status: DISCONTINUED | OUTPATIENT
Start: 2022-07-01 | End: 2022-07-03 | Stop reason: HOSPADM

## 2022-07-01 RX ORDER — SODIUM CHLORIDE 9 MG/ML
50 INJECTION, SOLUTION INTRAVENOUS CONTINUOUS
Status: DISCONTINUED | OUTPATIENT
Start: 2022-07-01 | End: 2022-07-02

## 2022-07-01 RX ORDER — NITROGLYCERIN 0.4 MG/1
0.4 TABLET SUBLINGUAL
Status: DISCONTINUED | OUTPATIENT
Start: 2022-07-01 | End: 2022-07-02

## 2022-07-01 RX ORDER — LOSARTAN POTASSIUM 25 MG/1
25 TABLET ORAL
Status: DISCONTINUED | OUTPATIENT
Start: 2022-07-01 | End: 2022-07-03 | Stop reason: HOSPADM

## 2022-07-01 RX ORDER — ENOXAPARIN SODIUM 100 MG/ML
40 INJECTION SUBCUTANEOUS
Status: DISCONTINUED | OUTPATIENT
Start: 2022-07-01 | End: 2022-07-03 | Stop reason: HOSPADM

## 2022-07-01 RX ORDER — PROMETHAZINE HYDROCHLORIDE 12.5 MG/1
12.5 TABLET ORAL EVERY 6 HOURS PRN
Status: DISCONTINUED | OUTPATIENT
Start: 2022-07-01 | End: 2022-07-02

## 2022-07-01 RX ORDER — GABAPENTIN 600 MG/1
600 TABLET ORAL 3 TIMES DAILY
Status: DISCONTINUED | OUTPATIENT
Start: 2022-07-01 | End: 2022-07-02

## 2022-07-01 RX ORDER — HYDROCODONE BITARTRATE AND ACETAMINOPHEN 7.5; 325 MG/1; MG/1
1 TABLET ORAL EVERY 6 HOURS PRN
Status: DISCONTINUED | OUTPATIENT
Start: 2022-07-01 | End: 2022-07-03 | Stop reason: HOSPADM

## 2022-07-01 RX ORDER — METOPROLOL TARTRATE 50 MG/1
50 TABLET, FILM COATED ORAL EVERY 12 HOURS SCHEDULED
Status: DISCONTINUED | OUTPATIENT
Start: 2022-07-01 | End: 2022-07-03 | Stop reason: HOSPADM

## 2022-07-01 RX ORDER — AMOXICILLIN 250 MG
2 CAPSULE ORAL 2 TIMES DAILY
Status: DISCONTINUED | OUTPATIENT
Start: 2022-07-01 | End: 2022-07-03 | Stop reason: HOSPADM

## 2022-07-01 RX ORDER — SODIUM CHLORIDE 0.9 % (FLUSH) 0.9 %
10 SYRINGE (ML) INJECTION AS NEEDED
Status: DISCONTINUED | OUTPATIENT
Start: 2022-07-01 | End: 2022-07-01

## 2022-07-01 RX ORDER — CETIRIZINE HYDROCHLORIDE 10 MG/1
5 TABLET ORAL DAILY
Status: DISCONTINUED | OUTPATIENT
Start: 2022-07-02 | End: 2022-07-03 | Stop reason: HOSPADM

## 2022-07-01 RX ORDER — SODIUM CHLORIDE 9 MG/ML
75 INJECTION, SOLUTION INTRAVENOUS CONTINUOUS
Status: DISCONTINUED | OUTPATIENT
Start: 2022-07-01 | End: 2022-07-01

## 2022-07-01 RX ORDER — HYDRALAZINE HYDROCHLORIDE 25 MG/1
100 TABLET, FILM COATED ORAL 4 TIMES DAILY
Status: DISCONTINUED | OUTPATIENT
Start: 2022-07-01 | End: 2022-07-01

## 2022-07-01 RX ORDER — BISACODYL 5 MG/1
5 TABLET, DELAYED RELEASE ORAL DAILY PRN
Status: DISCONTINUED | OUTPATIENT
Start: 2022-07-01 | End: 2022-07-03 | Stop reason: HOSPADM

## 2022-07-01 RX ORDER — SODIUM CHLORIDE 0.9 % (FLUSH) 0.9 %
10 SYRINGE (ML) INJECTION EVERY 12 HOURS SCHEDULED
Status: DISCONTINUED | OUTPATIENT
Start: 2022-07-01 | End: 2022-07-03 | Stop reason: HOSPADM

## 2022-07-01 RX ORDER — SPIRONOLACTONE 25 MG/1
50 TABLET ORAL DAILY
Status: DISCONTINUED | OUTPATIENT
Start: 2022-07-02 | End: 2022-07-03

## 2022-07-01 RX ORDER — POLYETHYLENE GLYCOL 3350 17 G/17G
17 POWDER, FOR SOLUTION ORAL DAILY PRN
Status: DISCONTINUED | OUTPATIENT
Start: 2022-07-01 | End: 2022-07-03 | Stop reason: HOSPADM

## 2022-07-01 RX ORDER — BISACODYL 10 MG
10 SUPPOSITORY, RECTAL RECTAL DAILY PRN
Status: DISCONTINUED | OUTPATIENT
Start: 2022-07-01 | End: 2022-07-03 | Stop reason: HOSPADM

## 2022-07-01 RX ORDER — FERROUS SULFATE TAB EC 324 MG (65 MG FE EQUIVALENT) 324 (65 FE) MG
324 TABLET DELAYED RESPONSE ORAL
Status: DISCONTINUED | OUTPATIENT
Start: 2022-07-02 | End: 2022-07-03 | Stop reason: HOSPADM

## 2022-07-01 RX ORDER — NORTRIPTYLINE HYDROCHLORIDE 25 MG/1
75 CAPSULE ORAL NIGHTLY
Status: DISCONTINUED | OUTPATIENT
Start: 2022-07-01 | End: 2022-07-03 | Stop reason: HOSPADM

## 2022-07-01 RX ORDER — MONTELUKAST SODIUM 10 MG/1
10 TABLET ORAL DAILY
Status: DISCONTINUED | OUTPATIENT
Start: 2022-07-02 | End: 2022-07-03 | Stop reason: HOSPADM

## 2022-07-01 RX ADMIN — IPRATROPIUM BROMIDE AND ALBUTEROL SULFATE 3 ML: .5; 3 SOLUTION RESPIRATORY (INHALATION) at 20:41

## 2022-07-01 RX ADMIN — Medication 10 ML: at 20:52

## 2022-07-01 RX ADMIN — HYDROCODONE BITARTRATE AND ACETAMINOPHEN 1 TABLET: 7.5; 325 TABLET ORAL at 22:13

## 2022-07-01 RX ADMIN — SENNOSIDES AND DOCUSATE SODIUM 2 TABLET: 50; 8.6 TABLET ORAL at 20:51

## 2022-07-01 RX ADMIN — IOPAMIDOL 100 ML: 755 INJECTION, SOLUTION INTRAVENOUS at 16:14

## 2022-07-01 RX ADMIN — GABAPENTIN 600 MG: 600 TABLET, FILM COATED ORAL at 21:07

## 2022-07-01 RX ADMIN — METOPROLOL TARTRATE 50 MG: 50 TABLET, FILM COATED ORAL at 20:51

## 2022-07-01 RX ADMIN — LOSARTAN POTASSIUM 25 MG: 25 TABLET, FILM COATED ORAL at 21:07

## 2022-07-01 RX ADMIN — CLINDAMYCIN PHOSPHATE 300 MG: 300 INJECTION, SOLUTION INTRAVENOUS at 21:07

## 2022-07-01 RX ADMIN — INSULIN HUMAN 15 UNITS: 100 INJECTION, SUSPENSION SUBCUTANEOUS at 22:23

## 2022-07-01 RX ADMIN — SODIUM CHLORIDE 50 ML/HR: 9 INJECTION, SOLUTION INTRAVENOUS at 20:52

## 2022-07-01 RX ADMIN — DICYCLOMINE HYDROCHLORIDE 10 MG: 10 CAPSULE ORAL at 20:51

## 2022-07-01 RX ADMIN — HYDRALAZINE HYDROCHLORIDE 100 MG: 25 TABLET, FILM COATED ORAL at 21:07

## 2022-07-01 RX ADMIN — NORTRIPTYLINE HYDROCHLORIDE 75 MG: 25 CAPSULE ORAL at 22:22

## 2022-07-01 NOTE — ED PROVIDER NOTES
Subjective   History of Present Illness  72-year-old female presents with shortness of breath.  She has had some cough she states she has had some green sputum or sinus congestion.  She had had reverse shoulder replacement on the 29th was discharged yesterday.  Reports oxygen has been 89 to 91%.  She states she has had some headache most of this week.  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  She has had no fever.  No leg pain or edema.  She does not normally use oxygen at home.  She states she has only taken 2 pain pills since her discharge.  Review of Systems   Constitutional: Negative for fever and unexpected weight change.   HENT: Positive for congestion. Negative for sore throat.    Eyes: Negative for pain.   Respiratory: Positive for cough and shortness of breath.    Cardiovascular: Negative for chest pain and leg swelling.   Gastrointestinal: Negative for abdominal pain, diarrhea and vomiting.   Genitourinary: Positive for decreased urine volume. Negative for dysuria and urgency.   Musculoskeletal: Negative for back pain.        Right reverse shoulder replacement 2 days ago   Skin: Negative for rash.   Neurological: Positive for headaches. Negative for dizziness and weakness.   Psychiatric/Behavioral: Positive for hallucinations. Negative for confusion.       Past Medical History:   Diagnosis Date   • Diabetes mellitus (HCC)    • Environmental allergies    • GERD (gastroesophageal reflux disease)    • Hypertension    • Migraine    • Mitral valve prolapse    • Sleep apnea        Allergies   Allergen Reactions   • Butalbital Nausea And Vomiting   • Codeine Rash   • Erythromycin Nausea And Vomiting   • Metoclopramide Other (See Comments)   • Penicillin G Hives   • Sumatriptan Nausea And Vomiting   • Tyloxapol Nausea And Vomiting   • Zelnorm [Tegaserod] Nausea And Vomiting   • Actos [Pioglitazone] Nausea And Vomiting   • Amoxicillin Hives   • Cymbalta  [Duloxetine Hcl] Nausea And Vomiting   • Oxycodone-Acetaminophen Nausea And Vomiting     PATIENT REPORTS PARKINSON'S SYMPTOMS WHILE TAKING   • Sulfa Antibiotics Rash       Past Surgical History:   Procedure Laterality Date   • APPENDECTOMY     • CHOLECYSTECTOMY     • HYSTERECTOMY     • KNEE ARTHROPLASTY Right     x2   • ROTATOR CUFF REPAIR Bilateral    • TOTAL SHOULDER ARTHROPLASTY W/ DISTAL CLAVICLE EXCISION Right 2022    Procedure: TOTAL SHOULDER REVERSE ARTHROPLASTY;  Surgeon: Connor Briceno MD;  Location: [facility];  Service: Orthopedics;  Laterality: Right;   • TOTAL SHOULDER REPLACEMENT Left        Family History   Problem Relation Age of Onset   • Heart failure Mother    • Cancer Father        Social History     Socioeconomic History   • Marital status:    Tobacco Use   • Smoking status: Former Smoker     Quit date: 1980     Years since quittin.2   • Smokeless tobacco: Never Used   Vaping Use   • Vaping Use: Never used   Substance and Sexual Activity   • Alcohol use: Not Currently     Comment: occasionally   • Drug use: Not Currently   • Sexual activity: Defer     Prior to Admission medications    Medication Sig Start Date End Date Taking? Authorizing Provider   acebutolol (SECTRAL) 200 MG capsule Take 200 mg by mouth 2 (Two) Times a Day. 21   Emergency, Nurse Jayesh RN   Artificial Tear Solution (Just Tears Eye Drops) solution Apply 1 drop to eye(s) as directed by provider.    ProviderLeonard MD   B-D UF III MINI PEN NEEDLES 31G X 5 MM misc USE WITH INSULIN INJECTIONS TWICE DAILY 3/31/21   Emergency, Nurse Jayesh RN   Cetirizine HCl 10 MG capsule Take 1 capsule by mouth Daily.    Emergency, Nurse Epic, RN   Continuous Blood Gluc  (Dexcom G6 ) device 1 (ONE) DEVICE AS DIRECTED 22   Leonard Carl MD   Continuous Blood Gluc Sensor (Dexcom G6 Sensor) 1 (ONE) MISC AS DIRECTED 22   Leonard Carl MD   Continuous Blood Gluc  "Transmit (Dexcom G6 Transmitter) misc See Admin Instructions. 5/14/22   Leonard Carl MD   dexlansoprazole (DEXILANT) 60 MG capsule Take 60 mg by mouth Daily.    Leonard Carl MD   dicyclomine (BENTYL) 10 MG capsule TAKE 1 CAPSULE BY MOUTH TWICE A DAY AFTER MEALS BREAKFAST AND DINNER FOR 30 DAYS 3/21/22   Leonard Carl MD   ferrous sulfate 325 (65 FE) MG tablet HOLD DOS 6/28/13   Nurse Jayesh Buck RN   furosemide (LASIX) 40 MG tablet Take 20 mg by mouth Daily. HOLD DOS 3/22/21   Nurse Jayesh Buck RN   gabapentin (NEURONTIN) 600 MG tablet Take 600 mg by mouth 3 (Three) Times a Day. 5/12/22   Leonard Carl MD   glipizide (GLUCOTROL XL) 10 MG 24 hr tablet Take 10 mg by mouth Daily. HOLD DOS 2/6/21   Nurse Jayesh Buck RN   glucose blood test strip ONETOUCH ULTRA BLUE STRP 10/29/18   Nurse Jayesh Buck RN   glucose blood test strip OneTouch Ultra Test strips   CHECK FASTING BLOOD SUGAR 4 TIMES A DAY    Leonard Carl MD   hydrALAZINE (APRESOLINE) 100 MG tablet Take 100 mg by mouth 4 (Four) Times a Day. 5/15/22   Leonard Carl MD   HYDROcodone-acetaminophen (Norco) 7.5-325 MG per tablet Take 1 tablet by mouth Every 6 (Six) Hours As Needed for Moderate Pain . 6/29/22   Connor Briceno MD   Insulin Lispro Prot & Lispro (humaLOG 75-25) (75-25) 100 UNIT/ML suspension pen-injector pen 16 Units 2 (Two) Times a Day With Meals. HOLD DOS    Leonard Carl MD   Insulin Pen Needle (BD Pen Needle Mary U/F) 32G X 4 MM misc BD PEN NEEDLE MARY U/F 32G X 4 MM 12/19/17   Nurse Jayesh Buck RN   Insulin Syringe-Needle U-100 (ReliOn Insulin Syringe) 31G X 15/64\" 0.3 ML misc RELION INSULIN SYRINGE 31G X 15/64\" 0.3 ML 10/3/18   Nurse Jayesh Buck RN   metFORMIN ER (GLUCOPHAGE-XR) 500 MG 24 hr tablet Take 1,000 mg by mouth 2 (Two) Times a Day. HOLD 48 hours before surgery 2/4/21   Emergency, Nurse Epic, RN   montelukast (SINGULAIR) 10 MG tablet Take 10 mg " by mouth Daily. 6/28/13   Emergency, Nurse REX Mcconnell   nitroglycerin (NITROSTAT) 0.4 MG SL tablet nitroglycerin 0.4 mg sublingual tablet    Leonard Carl MD   nortriptyline (PAMELOR) 75 MG capsule Take 75 mg by mouth every night at bedtime. 3/23/22   Leonard Carl MD   OneTouch Ultra test strip CHECK FASTING BLOOD SUGAR 4 TIMES A DAY 3/25/21   Emergency, Nurse REX Mcconnell   polyethylene glycol (MIRALAX) 17 GM/SCOOP powder Take 17 g by mouth Daily As Needed. HOLD DOS    Emergency, Nurse REX Mcconnell   promethazine (PHENERGAN) 12.5 MG tablet Take 1 tablet by mouth Every 6 (Six) Hours As Needed for Nausea or Vomiting. 6/29/22   Connor Briceno MD   spironolactone (ALDACTONE) 50 MG tablet Take 50 mg by mouth Daily. HOLD DOS 5/6/22   Leonard Carl MD   telmisartan (MICARDIS) 40 MG tablet Take 40 mg by mouth 2 (Two) Times a Day. HOLD 24 hours before surgery 5/2/22   Leonard Carl MD           Objective   Physical Exam  72-year-old female awake alert.  Generally well-developed well-nourished.  Pupils equal round react to light.  Oropharynx mucous membranes moist neck supple chest reveals some rhonchi right base.  Cardiovascular regular rate and rhythm.  Abdomen soft nontender.  Patient's right shoulder is in a sling from her recent surgery.  Legs without tenderness or edema.  Skin without rash noted.  Neurologic exam without focal findings noted.  Procedures           ED Course      Results for orders placed or performed during the hospital encounter of 07/01/22   Respiratory Panel PCR w/COVID-19(SARS-CoV-2) MAGUE/SALLIE/FRANK/PAD/COR/MAD/ZENA In-House, NP Swab in UTM/VTM, 3-4 HR TAT - Swab, Nasopharynx    Specimen: Nasopharynx; Swab   Result Value Ref Range    ADENOVIRUS, PCR Not Detected Not Detected    Coronavirus 229E Not Detected Not Detected    Coronavirus HKU1 Not Detected Not Detected    Coronavirus NL63 Not Detected Not Detected    Coronavirus OC43 Not Detected Not Detected    COVID19 Not  Detected Not Detected - Ref. Range    Human Metapneumovirus Not Detected Not Detected    Human Rhinovirus/Enterovirus Not Detected Not Detected    Influenza A PCR Not Detected Not Detected    Influenza B PCR Not Detected Not Detected    Parainfluenza Virus 1 Not Detected Not Detected    Parainfluenza Virus 2 Not Detected Not Detected    Parainfluenza Virus 3 Not Detected Not Detected    Parainfluenza Virus 4 Not Detected Not Detected    RSV, PCR Not Detected Not Detected    Bordetella pertussis pcr Not Detected Not Detected    Bordetella parapertussis PCR Not Detected Not Detected    Chlamydophila pneumoniae PCR Not Detected Not Detected    Mycoplasma pneumo by PCR Not Detected Not Detected   Comprehensive Metabolic Panel    Specimen: Blood   Result Value Ref Range    Glucose 72 65 - 99 mg/dL    BUN 25 (H) 8 - 23 mg/dL    Creatinine 1.44 (H) 0.57 - 1.00 mg/dL    Sodium 124 (L) 136 - 145 mmol/L    Potassium 5.1 3.5 - 5.2 mmol/L    Chloride 89 (L) 98 - 107 mmol/L    CO2 22.0 22.0 - 29.0 mmol/L    Calcium 8.9 8.6 - 10.5 mg/dL    Total Protein 6.6 6.0 - 8.5 g/dL    Albumin 4.10 3.50 - 5.20 g/dL    ALT (SGPT) 20 1 - 33 U/L    AST (SGOT) 23 1 - 32 U/L    Alkaline Phosphatase 59 39 - 117 U/L    Total Bilirubin 0.3 0.0 - 1.2 mg/dL    Globulin 2.5 gm/dL    A/G Ratio 1.6 g/dL    BUN/Creatinine Ratio 17.4 7.0 - 25.0    Anion Gap 13.0 5.0 - 15.0 mmol/L    eGFR 38.5 (L) >60.0 mL/min/1.73   Troponin    Specimen: Blood   Result Value Ref Range    Troponin T <0.010 0.000 - 0.030 ng/mL   BNP    Specimen: Blood   Result Value Ref Range    proBNP 863.5 0.0 - 900.0 pg/mL   CBC Auto Differential    Specimen: Blood   Result Value Ref Range    WBC 11.10 (H) 3.40 - 10.80 10*3/mm3    RBC 3.58 (L) 3.77 - 5.28 10*6/mm3    Hemoglobin 10.0 (L) 12.0 - 15.9 g/dL    Hematocrit 30.7 (L) 34.0 - 46.6 %    MCV 85.7 79.0 - 97.0 fL    MCH 27.9 26.6 - 33.0 pg    MCHC 32.5 31.5 - 35.7 g/dL    RDW 13.4 12.3 - 15.4 %    RDW-SD 39.8 37.0 - 54.0 fl    MPV 6.8  "6.0 - 12.0 fL    Platelets 328 140 - 450 10*3/mm3    Neutrophil % 69.6 42.7 - 76.0 %    Lymphocyte % 17.7 (L) 19.6 - 45.3 %    Monocyte % 8.5 5.0 - 12.0 %    Eosinophil % 3.6 0.3 - 6.2 %    Basophil % 0.6 0.0 - 1.5 %    Neutrophils, Absolute 7.70 (H) 1.70 - 7.00 10*3/mm3    Lymphocytes, Absolute 2.00 0.70 - 3.10 10*3/mm3    Monocytes, Absolute 0.90 0.10 - 0.90 10*3/mm3    Eosinophils, Absolute 0.40 0.00 - 0.40 10*3/mm3    Basophils, Absolute 0.10 0.00 - 0.20 10*3/mm3    nRBC 0.0 0.0 - 0.2 /100 WBC   ECG 12 Lead   Result Value Ref Range    QT Interval 363 ms     XR Chest 1 View    Result Date: 7/1/2022   1. Right basilar atelectasis and pulmonary vascular crowding. No acute cardiopulmonary process is identified. 2. Senescent changes in the bony thorax and thoracic aorta.   Electronically Signed By-Daron Garrett DO On:7/1/2022 1:03 PM This report was finalized on [id number] by  Daron Garrett DO.    CT Angiogram Chest Pulmonary Embolism    Result Date: 7/1/2022   1. There is moderate right hemidiaphragm elevation prison up the right hemithorax which appears new or increased in comparison to the preoperative chest x-ray of 6/17/2022. 2. There is passive atelectatic change within the right mid to lower lung zone. Mild lingular atelectasis. 3. Mild peripheral groundglass densities in the left upper lobe may represent scattered areas of pneumonitis or mild atelectasis. 4. No pulmonary embolism. 5. Surgical changes related to recent right shoulder replacement.    Electronically Signed By-Patricia Bianchi MD On:7/1/2022 4:20 PM This report was finalized on [id number] by  Patricia Bianchi MD.    Medications   sodium chloride 0.9 % infusion (has no administration in time range)   iopamidol (ISOVUE-370) 76 % injection 100 mL (100 mL Intravenous Given 7/1/22 1614)     /72 (BP Location: Left arm, Patient Position: Lying)   Pulse 71   Temp 99.1 °F (37.3 °C)   Resp 17   Ht 147.3 cm (58\")   Wt 79.4 kg (175 lb)   " SpO2 96%   BMI 36.58 kg/m²                                        [facility]  Chart review: Patient was noted to have reverse shoulder replacement with discharged yesterday  Comorbidity: As per past history  Differential: Atelectasis, pneumonia, aspiration, pulmonary embolus,  My EKG interpretation: Sinus rhythm rate of 70 with PAC first-degree AV block left anterior fascicular block low voltage precordial leads.  Compared to previous no significant change noted  Lab: Restaurant virus panel all negative troponin negative proBNP normal comprehensive metabolic panel BUN 25 creatinine 1.44 sodium 124 potassium 5.1 chloride 89 CBC white count 11.1 with hemoglobin 10 platelet count 328 69 segs no bands.  Respiratory virus panel all negative  Radiology: I reviewed all x-rays.  Chest x-ray reveals right basilar atelectasis and pulmonary vascular crowding with elevated right hemidiaphragm.  There is volume loss secondary to this.  CT chest PE protocol reveals moderate right hemidiaphragm elevation correction up to the right hemithorax which appears new or increased compared to preoperative chest x-ray from earlier this month.  There is passive atelectatic change within the right mid to lower lung zone.  There is no pulmonary embolus.  Discussion/treatment: Patient had IV placed.  Her sodium today is 124 was noted to have been 118 yesterday.  Her potassium is improved it has been higher yesterday.  Patient be started on incentive spirometry.  Patient was gotten up to walk per respiratory therapist.  She could only go a few steps without being completely out of breath.  She did not desaturate though.  Findings were discussed with her .  She will be admitted.  She will require incentive spirometry.  It appears that much of her symptoms may be related to right hemidiaphragm paralysis.    reports that there was a concern about this complication when they did shoulder block for her surgery.  Patient does have elevation of  her creatinine over baseline.  We will start her on low-dose saline.  Patient was discussed with the nurse practitioner for the hospitalist.  Admit to their service for continued respiratory support  Patient was evaluated using appropriate PPE      Final diagnoses:   Diaphragm paralysis   Shortness of breath   Hyponatremia   Acute renal disease       ED Disposition  ED Disposition     ED Disposition   Decision to Admit    Condition   --    Comment   Level of Care: Telemetry [5]   Admitting Physician: ADINA JOHNSON [[id number]]               No follow-up provider specified.       Medication List      No changes were made to your prescriptions during this visit.          Jamie Green MD  07/01/22 0491       Jamie Green MD  07/01/22 1879

## 2022-07-01 NOTE — TELEPHONE ENCOUNTER
Patients  called office to state wife is short of air and oxygen level is at 92.  I advised him to take patient to ER now.  He expressed an understanding.  He asked how he could get a hold of Dr Parekh (anesthesiologist).  I advised him to speak to them at the ER about this.

## 2022-07-01 NOTE — PROGRESS NOTES
Exercise Oximetry    Patient Name:Ramona Luis   MRN: [id number]   Date: 07/01/22             ROOM AIR BASELINE   SpO2%97   Heart Rate 88   Blood Pressure      EXERCISE ON ROOM AIR SpO2% EXERCISE ON O2 @  LPM SpO2%   1 MINUTE 94 1 MINUTE    2 MINUTES 95 2 MINUTES    3 MINUTES  3 MINUTES    4 MINUTES  4 MINUTES    5 MINUTES  5 MINUTES    6 MINUTES  6 MINUTES               Distance Walked  From bed to chair Distance Walked   Dyspnea (Hanh Scale)   Dyspnea (Hanh Scale)   Fatigue (Hanh Scale)   Fatigue (Hanh Scale)   SpO2% Post Exercise  97 SpO2% Post Exercise   HR Post Exercise  87 HR Post Exercise   Time to Recovery   Time to Recovery     Comments: Patient unable to complete walk.  Patient walked from bed to chair and complained of shortness of breath.  Patient on room air and sats were 94-95%.   Patient returned to bed and MD at bedside.  Thank you

## 2022-07-01 NOTE — NURSING NOTE
Unable to do skin assessment. Patient and  refusing to let staff undress patient to put a gown on and to assess skin.

## 2022-07-02 LAB
ANION GAP SERPL CALCULATED.3IONS-SCNC: 13 MMOL/L (ref 5–15)
BILIRUB UR QL STRIP: NEGATIVE
BUN SERPL-MCNC: 22 MG/DL (ref 8–23)
BUN/CREAT SERPL: 17.7 (ref 7–25)
CA-I SERPL ISE-MCNC: 1.26 MMOL/L (ref 1.2–1.3)
CALCIUM SPEC-SCNC: 9.2 MG/DL (ref 8.6–10.5)
CHLORIDE SERPL-SCNC: 90 MMOL/L (ref 98–107)
CK SERPL-CCNC: 211 U/L (ref 20–180)
CLARITY UR: CLEAR
CO2 SERPL-SCNC: 22 MMOL/L (ref 22–29)
COLOR UR: YELLOW
CREAT SERPL-MCNC: 1.24 MG/DL (ref 0.57–1)
D-LACTATE SERPL-SCNC: 1 MMOL/L (ref 0.5–2)
EGFRCR SERPLBLD CKD-EPI 2021: 46 ML/MIN/1.73
GLUCOSE BLDC GLUCOMTR-MCNC: 129 MG/DL (ref 70–105)
GLUCOSE BLDC GLUCOMTR-MCNC: 238 MG/DL (ref 70–105)
GLUCOSE BLDC GLUCOMTR-MCNC: 259 MG/DL (ref 70–105)
GLUCOSE SERPL-MCNC: 96 MG/DL (ref 65–99)
GLUCOSE UR STRIP-MCNC: ABNORMAL MG/DL
HGB UR QL STRIP.AUTO: NEGATIVE
IRON 24H UR-MRATE: 25 MCG/DL (ref 37–145)
IRON SATN MFR SERPL: 7 % (ref 20–50)
KETONES UR QL STRIP: NEGATIVE
LEUKOCYTE ESTERASE UR QL STRIP.AUTO: NEGATIVE
MAGNESIUM SERPL-MCNC: 1.6 MG/DL (ref 1.6–2.4)
NITRITE UR QL STRIP: NEGATIVE
OSMOLALITY SERPL: 269 MOSM/KG (ref 280–301)
OSMOLALITY SERPL: 270 MOSM/KG (ref 280–301)
OSMOLALITY SERPL: 272 MOSM/KG (ref 280–301)
OSMOLALITY UR: 197 MOSM/KG (ref 300–800)
PH UR STRIP.AUTO: <=5 [PH] (ref 5–8)
PHOSPHATE SERPL-MCNC: 4.8 MG/DL (ref 2.5–4.5)
POTASSIUM SERPL-SCNC: 5.2 MMOL/L (ref 3.5–5.2)
PROCALCITONIN SERPL-MCNC: 0.09 NG/ML (ref 0–0.25)
PROT UR QL STRIP: NEGATIVE
SODIUM SERPL-SCNC: 125 MMOL/L (ref 136–145)
SODIUM UR-SCNC: 52 MMOL/L
SP GR UR STRIP: 1.01 (ref 1–1.03)
TIBC SERPL-MCNC: 362 MCG/DL (ref 298–536)
TRANSFERRIN SERPL-MCNC: 243 MG/DL (ref 200–360)
TROPONIN T SERPL-MCNC: <0.01 NG/ML (ref 0–0.03)
TSH SERPL DL<=0.05 MIU/L-ACNC: 1.31 UIU/ML (ref 0.27–4.2)
TSH SERPL DL<=0.05 MIU/L-ACNC: 1.31 UIU/ML (ref 0.27–4.2)
UROBILINOGEN UR QL STRIP: ABNORMAL

## 2022-07-02 PROCEDURE — 84484 ASSAY OF TROPONIN QUANT: CPT | Performed by: NURSE PRACTITIONER

## 2022-07-02 PROCEDURE — 80048 BASIC METABOLIC PNL TOTAL CA: CPT | Performed by: INTERNAL MEDICINE

## 2022-07-02 PROCEDURE — 84300 ASSAY OF URINE SODIUM: CPT | Performed by: INTERNAL MEDICINE

## 2022-07-02 PROCEDURE — 94664 DEMO&/EVAL PT USE INHALER: CPT

## 2022-07-02 PROCEDURE — 83036 HEMOGLOBIN GLYCOSYLATED A1C: CPT | Performed by: INTERNAL MEDICINE

## 2022-07-02 PROCEDURE — 82550 ASSAY OF CK (CPK): CPT | Performed by: INTERNAL MEDICINE

## 2022-07-02 PROCEDURE — 83605 ASSAY OF LACTIC ACID: CPT | Performed by: INTERNAL MEDICINE

## 2022-07-02 PROCEDURE — 84443 ASSAY THYROID STIM HORMONE: CPT | Performed by: INTERNAL MEDICINE

## 2022-07-02 PROCEDURE — 83540 ASSAY OF IRON: CPT | Performed by: NURSE PRACTITIONER

## 2022-07-02 PROCEDURE — 94760 N-INVAS EAR/PLS OXIMETRY 1: CPT

## 2022-07-02 PROCEDURE — 99233 SBSQ HOSP IP/OBS HIGH 50: CPT | Performed by: INTERNAL MEDICINE

## 2022-07-02 PROCEDURE — 25010000002 ENOXAPARIN PER 10 MG: Performed by: INTERNAL MEDICINE

## 2022-07-02 PROCEDURE — 82330 ASSAY OF CALCIUM: CPT | Performed by: INTERNAL MEDICINE

## 2022-07-02 PROCEDURE — 82962 GLUCOSE BLOOD TEST: CPT

## 2022-07-02 PROCEDURE — 84100 ASSAY OF PHOSPHORUS: CPT | Performed by: INTERNAL MEDICINE

## 2022-07-02 PROCEDURE — 81003 URINALYSIS AUTO W/O SCOPE: CPT | Performed by: INTERNAL MEDICINE

## 2022-07-02 PROCEDURE — 83930 ASSAY OF BLOOD OSMOLALITY: CPT | Performed by: INTERNAL MEDICINE

## 2022-07-02 PROCEDURE — 94799 UNLISTED PULMONARY SVC/PX: CPT

## 2022-07-02 PROCEDURE — 83735 ASSAY OF MAGNESIUM: CPT | Performed by: INTERNAL MEDICINE

## 2022-07-02 PROCEDURE — 84466 ASSAY OF TRANSFERRIN: CPT | Performed by: NURSE PRACTITIONER

## 2022-07-02 PROCEDURE — 83935 ASSAY OF URINE OSMOLALITY: CPT | Performed by: INTERNAL MEDICINE

## 2022-07-02 PROCEDURE — 63710000001 INSULIN LISPRO (HUMAN) PER 5 UNITS: Performed by: INTERNAL MEDICINE

## 2022-07-02 PROCEDURE — 84145 PROCALCITONIN (PCT): CPT | Performed by: INTERNAL MEDICINE

## 2022-07-02 RX ORDER — ACETAMINOPHEN 500 MG
500 TABLET ORAL AS NEEDED
COMMUNITY

## 2022-07-02 RX ORDER — DOXYCYCLINE HYCLATE 50 MG/1
324 CAPSULE, GELATIN COATED ORAL
COMMUNITY

## 2022-07-02 RX ORDER — DIPHENHYDRAMINE HCL 25 MG
25 CAPSULE ORAL AS NEEDED
COMMUNITY
End: 2022-07-03 | Stop reason: HOSPADM

## 2022-07-02 RX ORDER — GABAPENTIN 600 MG/1
300 TABLET ORAL 3 TIMES DAILY
Status: DISCONTINUED | OUTPATIENT
Start: 2022-07-02 | End: 2022-07-03 | Stop reason: HOSPADM

## 2022-07-02 RX ORDER — ERGOCALCIFEROL 1.25 MG/1
50000 CAPSULE ORAL WEEKLY
COMMUNITY

## 2022-07-02 RX ORDER — DEXTROSE MONOHYDRATE 25 G/50ML
25 INJECTION, SOLUTION INTRAVENOUS
Status: DISCONTINUED | OUTPATIENT
Start: 2022-07-02 | End: 2022-07-03 | Stop reason: HOSPADM

## 2022-07-02 RX ORDER — FUROSEMIDE 40 MG/1
40 TABLET ORAL
Status: DISCONTINUED | OUTPATIENT
Start: 2022-07-02 | End: 2022-07-03 | Stop reason: HOSPADM

## 2022-07-02 RX ORDER — HYDROCODONE BITARTRATE AND ACETAMINOPHEN 7.5; 325 MG/1; MG/1
1 TABLET ORAL EVERY 6 HOURS PRN
COMMUNITY
End: 2022-11-03

## 2022-07-02 RX ORDER — INSULIN LISPRO 100 [IU]/ML
0-9 INJECTION, SOLUTION INTRAVENOUS; SUBCUTANEOUS AS NEEDED
Status: DISCONTINUED | OUTPATIENT
Start: 2022-07-02 | End: 2022-07-03 | Stop reason: HOSPADM

## 2022-07-02 RX ORDER — OLANZAPINE 10 MG/2ML
1 INJECTION, POWDER, LYOPHILIZED, FOR SOLUTION INTRAMUSCULAR
Status: DISCONTINUED | OUTPATIENT
Start: 2022-07-02 | End: 2022-07-03 | Stop reason: HOSPADM

## 2022-07-02 RX ORDER — INSULIN LISPRO 100 [IU]/ML
0-9 INJECTION, SOLUTION INTRAVENOUS; SUBCUTANEOUS
Status: DISCONTINUED | OUTPATIENT
Start: 2022-07-02 | End: 2022-07-03 | Stop reason: HOSPADM

## 2022-07-02 RX ORDER — NICOTINE POLACRILEX 4 MG
15 LOZENGE BUCCAL
Status: DISCONTINUED | OUTPATIENT
Start: 2022-07-02 | End: 2022-07-03 | Stop reason: HOSPADM

## 2022-07-02 RX ORDER — PHENOL 1.4 %
10 AEROSOL, SPRAY (ML) MUCOUS MEMBRANE NIGHTLY PRN
COMMUNITY

## 2022-07-02 RX ADMIN — METOPROLOL TARTRATE 50 MG: 50 TABLET, FILM COATED ORAL at 20:38

## 2022-07-02 RX ADMIN — SPIRONOLACTONE 50 MG: 25 TABLET ORAL at 08:46

## 2022-07-02 RX ADMIN — FERROUS SULFATE TAB EC 324 MG (65 MG FE EQUIVALENT) 324 MG: 324 (65 FE) TABLET DELAYED RESPONSE at 08:46

## 2022-07-02 RX ADMIN — MONTELUKAST 10 MG: 10 TABLET, FILM COATED ORAL at 08:45

## 2022-07-02 RX ADMIN — NORTRIPTYLINE HYDROCHLORIDE 75 MG: 25 CAPSULE ORAL at 20:36

## 2022-07-02 RX ADMIN — GABAPENTIN 300 MG: 600 TABLET, FILM COATED ORAL at 20:36

## 2022-07-02 RX ADMIN — DICYCLOMINE HYDROCHLORIDE 10 MG: 10 CAPSULE ORAL at 16:14

## 2022-07-02 RX ADMIN — CETIRIZINE HYDROCHLORIDE TABLETS 5 MG: 10 TABLET, FILM COATED ORAL at 08:44

## 2022-07-02 RX ADMIN — PANTOPRAZOLE SODIUM 40 MG: 40 TABLET, DELAYED RELEASE ORAL at 06:27

## 2022-07-02 RX ADMIN — INSULIN LISPRO 6 UNITS: 100 INJECTION, SOLUTION INTRAVENOUS; SUBCUTANEOUS at 18:26

## 2022-07-02 RX ADMIN — IPRATROPIUM BROMIDE AND ALBUTEROL SULFATE 3 ML: .5; 3 SOLUTION RESPIRATORY (INHALATION) at 08:28

## 2022-07-02 RX ADMIN — HYDRALAZINE HYDROCHLORIDE 100 MG: 25 TABLET, FILM COATED ORAL at 20:38

## 2022-07-02 RX ADMIN — HYDROCODONE BITARTRATE AND ACETAMINOPHEN 1 TABLET: 7.5; 325 TABLET ORAL at 04:48

## 2022-07-02 RX ADMIN — Medication 15 G: at 20:38

## 2022-07-02 RX ADMIN — METOPROLOL TARTRATE 50 MG: 50 TABLET, FILM COATED ORAL at 08:46

## 2022-07-02 RX ADMIN — DICYCLOMINE HYDROCHLORIDE 10 MG: 10 CAPSULE ORAL at 08:46

## 2022-07-02 RX ADMIN — SENNOSIDES AND DOCUSATE SODIUM 2 TABLET: 50; 8.6 TABLET ORAL at 08:46

## 2022-07-02 RX ADMIN — CLINDAMYCIN PHOSPHATE 300 MG: 300 INJECTION, SOLUTION INTRAVENOUS at 04:48

## 2022-07-02 RX ADMIN — HYDRALAZINE HYDROCHLORIDE 100 MG: 25 TABLET, FILM COATED ORAL at 08:45

## 2022-07-02 RX ADMIN — FUROSEMIDE 40 MG: 40 TABLET ORAL at 18:27

## 2022-07-02 RX ADMIN — CLINDAMYCIN PHOSPHATE 300 MG: 300 INJECTION, SOLUTION INTRAVENOUS at 13:49

## 2022-07-02 RX ADMIN — ENOXAPARIN SODIUM 40 MG: 100 INJECTION SUBCUTANEOUS at 16:14

## 2022-07-02 RX ADMIN — FUROSEMIDE 20 MG: 20 TABLET ORAL at 08:47

## 2022-07-02 RX ADMIN — SENNOSIDES AND DOCUSATE SODIUM 2 TABLET: 50; 8.6 TABLET ORAL at 20:38

## 2022-07-02 RX ADMIN — IPRATROPIUM BROMIDE AND ALBUTEROL SULFATE 3 ML: .5; 3 SOLUTION RESPIRATORY (INHALATION) at 12:05

## 2022-07-02 RX ADMIN — IPRATROPIUM BROMIDE AND ALBUTEROL SULFATE 3 ML: .5; 3 SOLUTION RESPIRATORY (INHALATION) at 20:27

## 2022-07-02 RX ADMIN — DICYCLOMINE HYDROCHLORIDE 10 MG: 10 CAPSULE ORAL at 20:37

## 2022-07-02 RX ADMIN — CLINDAMYCIN PHOSPHATE 300 MG: 300 INJECTION, SOLUTION INTRAVENOUS at 20:38

## 2022-07-02 RX ADMIN — GABAPENTIN 600 MG: 600 TABLET, FILM COATED ORAL at 08:50

## 2022-07-02 RX ADMIN — POLYETHYLENE GLYCOL 3350 17 G: 17 POWDER, FOR SOLUTION ORAL at 08:54

## 2022-07-02 RX ADMIN — HYDRALAZINE HYDROCHLORIDE 100 MG: 25 TABLET, FILM COATED ORAL at 16:14

## 2022-07-02 RX ADMIN — Medication 10 ML: at 20:39

## 2022-07-02 RX ADMIN — GABAPENTIN 300 MG: 600 TABLET, FILM COATED ORAL at 16:14

## 2022-07-02 NOTE — CONSULTS
INITIAL CONSULT NOTE      Patient Name: Ramona Luis  : 1950  MRN: [id number]  Primary Care Physician: Efrain Newell MD  Date of admission: 2022    Patient Care Team:  Efrain Newell MD as PCP - General        Reason for Consult:       Hyponatremia and CKD  Subjective      Patient seen and examined, denies any new complaints  History of Present Illness:   Chief Complaint:   Chief Complaint   Patient presents with   • Shortness of Breath     HISTORY:  Ramona Luis is a 72 y.o. female with history of hypertension diabetes mellitus gastroesophageal reflux disease migraine, sleep apnea, mitral valve prolapse, seems like he has mild chronic kidney disease, creatinine around 1.0 in 2018, now creatinine around 1.1 to 1.2 mg/dL, most likely has chronic kidney disease, stage II-III, history of chronic hyponatremia, serum sodium has been consistently in the low 120s in the past, on 2022 serum sodium was 118, repeat serum sodium 125 on 2022, admitted this time on 2022 with complaints of shortness of breath, gradually worsening for about 48 hours prior to this hospitalization, increasing cough, small amount of sputum.  Gives a history of right diaphragm paralysis, previous pneumonia, as an outpatient she has been on Lasix, glipizide, metformin, spironolactone, myocarditis.  No previous echo, chest x-ray revealed pulmonary vascular crowding, no acute process, CT angiogram, 2022, moderate right hemidiaphragm elevation appears new and increased, passive atelectasis, questionable pneumonitis, no pulmonary embolism.    Renal consult requested due to low serum sodium and abnormal kidney function.       Review of systems:    Constitutional: No fever, no chills, no lethargy, no weakness.  HEENT:  No headache, otalgia, itchy eyes, nasal discharge or sore throat.  Cardiac:  Shortness of breath   chest:              No cough, phlegm or  wheezing.  Abdomen:  No abdominal pain, nausea or vomiting.  Neuro:  No focal weakness, abnormal movements orseizure like activity.  :   No hematuria, no pyuria, no dysuria, no flank pain.  ROS was otherwise negative except as mentioned in the [place].       Personal History:     Past Medical History:   Past Medical History:   Diagnosis Date   • Diabetes mellitus (HCC)    • Environmental allergies    • GERD (gastroesophageal reflux disease)    • Hypertension    • Migraine    • Mitral valve prolapse    • Sleep apnea        Surgical History:      Past Surgical History:   Procedure Laterality Date   • APPENDECTOMY     • CHOLECYSTECTOMY     • HYSTERECTOMY     • KNEE ARTHROPLASTY Right     x2   • ROTATOR CUFF REPAIR Bilateral    • TOTAL SHOULDER ARTHROPLASTY W/ DISTAL CLAVICLE EXCISION Right 6/29/2022    Procedure: TOTAL SHOULDER REVERSE ARTHROPLASTY;  Surgeon: Connor Briceno MD;  Location: [facility] OR;  Service: Orthopedics;  Laterality: Right;   • TOTAL SHOULDER REPLACEMENT Left        Family History: family history includes Cancer in her father; Heart failure in her mother. Otherwise pertinent FHx was reviewed and unremarkable.     Social History:  reports that she quit smoking about 42 years ago. She has never used smokeless tobacco. She reports previous alcohol use. She reports previous drug use.    Medications:  Prior to Admission medications    Medication Sig Start Date End Date Taking? Authorizing Provider   acebutolol (SECTRAL) 200 MG capsule Take 200 mg by mouth 2 (Two) Times a Day. 1/28/21  Yes Emergency, Nurse REX Mcconnell   acetaminophen (TYLENOL) 500 MG tablet Take 500 mg by mouth As Needed for Headache.   Yes ProviderLeonard MD   Cetirizine HCl 10 MG capsule Take 1 capsule by mouth Daily.   Yes Emergency, Nurse Epic, RN   dexlansoprazole (DEXILANT) 60 MG capsule Take 60 mg by mouth Daily.   Yes Leonard Carl MD   dicyclomine (BENTYL) 10 MG capsule TAKE 1 CAPSULE BY MOUTH TWICE A DAY  AFTER MEALS BREAKFAST AND DINNER FOR 30 DAYS 3/21/22  Yes Leonard Carl MD   diphenhydrAMINE (BENADRYL) 25 mg capsule Take 25 mg by mouth As Needed for Allergies.   Yes Leonard Carl MD   ferrous gluconate (FERGON) 324 MG tablet Take 324 mg by mouth Daily With Breakfast.   Yes Leonard Carl MD   furosemide (LASIX) 20 MG tablet Take 20 mg by mouth Daily. HOLD DOS 3/22/21  Yes Emergency, Nurse Epic, RN   gabapentin (NEURONTIN) 600 MG tablet Take 600 mg by mouth 3 (Three) Times a Day. 5/12/22  Yes Leonard Carl MD   glipizide (GLUCOTROL XL) 10 MG 24 hr tablet Take 10 mg by mouth Daily. HOLD DOS 2/6/21  Yes Emergency, Nurse REX Mcconnell   hydrALAZINE (APRESOLINE) 100 MG tablet Take 100 mg by mouth 3 (Three) Times a Day. 5/15/22  Yes Leonard Carl MD   HYDROcodone-acetaminophen (NORCO) 7.5-325 MG per tablet Take 1 tablet by mouth Every 6 (Six) Hours As Needed for Moderate Pain .   Yes Leonard Carl MD   Melatonin 10 MG tablet Take 10 mg by mouth At Night As Needed (sleep).   Yes Leonard Carl MD   metFORMIN (GLUCOPHAGE) 1000 MG tablet Take 1,000 mg by mouth 2 (Two) Times a Day With Meals.   Yes Leonard Carl MD   montelukast (SINGULAIR) 10 MG tablet Take 10 mg by mouth Daily. 6/28/13  Yes Emergency, Nurse REX Mcconnell   nortriptyline (PAMELOR) 75 MG capsule Take 75 mg by mouth every night at bedtime. 3/23/22  Yes Leonard Carl MD   spironolactone (ALDACTONE) 50 MG tablet Take 50 mg by mouth Daily. HOLD DOS 5/6/22  Yes Leonard Carl MD   telmisartan (MICARDIS) 40 MG tablet Take 40 mg by mouth 2 (Two) Times a Day. HOLD 24 hours before surgery 5/2/22  Yes Provider, Historical, MD   vitamin D (ERGOCALCIFEROL) 1.25 MG (96305 UT) capsule capsule Take 50,000 Units by mouth 1 (One) Time Per Week.   Yes Leonard Carl MD   HYDROcodone-acetaminophen (Norco) 7.5-325 MG per tablet Take 1 tablet by mouth Every 6 (Six) Hours As Needed for Moderate Pain .  "6/29/22 7/2/22 Yes Connor Briceno MD   Artificial Tear Solution (Just Tears Eye Drops) solution Apply 1 drop to eye(s) as directed by provider.  7/2/22  Leonard Carl MD B-D UF III MINI PEN NEEDLES 31G X 5 MM misc USE WITH INSULIN INJECTIONS TWICE DAILY 3/31/21 7/2/22  Emergency, Nurse Epic, RN   Continuous Blood Gluc  (Dexcom G6 ) device 1 (ONE) DEVICE AS DIRECTED 5/13/22 7/2/22  Leonard Carl MD   Continuous Blood Gluc Sensor (Dexcom G6 Sensor) 1 (ONE) MISC AS DIRECTED 5/20/22 7/2/22  Leonard Carl MD   Continuous Blood Gluc Transmit (Dexcom G6 Transmitter) misc See Admin Instructions. 5/14/22 7/2/22  Leonard Carl MD   ferrous sulfate 325 (65 FE) MG tablet HOLD DOS 6/28/13 7/2/22  Nurse Jayesh Buck RN   glucose blood test strip ONETOUCH ULTRA BLUE STRP 10/29/18 7/2/22  Nurse Jayesh Buck RN   glucose blood test strip OneTouch Ultra Test strips   CHECK FASTING BLOOD SUGAR 4 TIMES A DAY  7/2/22  Leonard Carl MD   Insulin Lispro Prot & Lispro (humaLOG 75-25) (75-25) 100 UNIT/ML suspension pen-injector pen 16 Units 2 (Two) Times a Day With Meals. HOLD DOS  7/2/22  Leonard Carl MD   Insulin Pen Needle (BD Pen Needle Mary U/F) 32G X 4 MM misc BD PEN NEEDLE MARY U/F 32G X 4 MM 12/19/17 7/2/22  Emergency, Nurse Mcconnell RN   Insulin Syringe-Needle U-100 (ReliOn Insulin Syringe) 31G X 15/64\" 0.3 ML misc RELION INSULIN SYRINGE 31G X 15/64\" 0.3 ML 10/3/18 7/2/22  EmergencyNurse Jayesh RN   metFORMIN ER (GLUCOPHAGE-XR) 500 MG 24 hr tablet Take 1,000 mg by mouth 2 (Two) Times a Day. HOLD 48 hours before surgery 2/4/21 7/2/22  Nurse Jayesh Buck RN   nitroglycerin (NITROSTAT) 0.4 MG SL tablet nitroglycerin 0.4 mg sublingual tablet  7/2/22  Provider, MD Leonard   OneTouch Ultra test strip CHECK FASTING BLOOD SUGAR 4 TIMES A DAY 3/25/21 7/2/22  Emergency, Nurse Epic, RN   polyethylene glycol (MIRALAX) 17 GM/SCOOP powder Take 17 g by mouth " Daily As Needed. HOLD DOS  7/2/22  Emergency, Nurse Jayesh, RN   promethazine (PHENERGAN) 12.5 MG tablet Take 1 tablet by mouth Every 6 (Six) Hours As Needed for Nausea or Vomiting. 6/29/22 7/2/22  Connor Briceno MD     Scheduled Meds:cetirizine, 5 mg, Oral, Daily  clindamycin, 300 mg, Intravenous, Q8H  dicyclomine, 10 mg, Oral, TID  enoxaparin, 40 mg, Subcutaneous, Q24H  ferrous sulfate, 324 mg, Oral, Daily With Breakfast  furosemide, 20 mg, Oral, Daily  gabapentin, 600 mg, Oral, TID  hydrALAZINE, 100 mg, Oral, TID  ipratropium-albuterol, 3 mL, Nebulization, 4x Daily - RT  losartan, 25 mg, Oral, Q24H  metoprolol tartrate, 50 mg, Oral, Q12H  montelukast, 10 mg, Oral, Daily  nortriptyline, 75 mg, Oral, Nightly  pantoprazole, 40 mg, Oral, QAM  polyethylene glycol, 17 g, Oral, Daily  senna-docusate sodium, 2 tablet, Oral, BID  sodium chloride, 10 mL, Intravenous, Q12H  spironolactone, 50 mg, Oral, Daily      Continuous Infusions:sodium chloride, 50 mL/hr, Last Rate: 50 mL/hr (07/01/22 2052)      PRN Meds:•  senna-docusate sodium **AND** polyethylene glycol **AND** bisacodyl **AND** bisacodyl  •  HYDROcodone-acetaminophen  •  polyethyl glycol-propyl glycol  •  sodium chloride  Allergies:    Allergies   Allergen Reactions   • Butalbital Nausea And Vomiting   • Codeine Rash   • Erythromycin Nausea And Vomiting   • Metoclopramide Other (See Comments)   • Penicillin G Hives   • Sumatriptan Nausea And Vomiting   • Tyloxapol Nausea And Vomiting   • Zelnorm [Tegaserod] Nausea And Vomiting   • Actos [Pioglitazone] Nausea And Vomiting   • Amoxicillin Hives   • Cymbalta [Duloxetine Hcl] Nausea And Vomiting   • Oxycodone-Acetaminophen Nausea And Vomiting     PATIENT REPORTS PARKINSON'S SYMPTOMS WHILE TAKING   • Sulfa Antibiotics Rash       Objective   Exam:     Vital Signs  Temp:  [96.8 °F (36 °C)-99.1 °F (37.3 °C)] 96.8 °F (36 °C)  Heart Rate:  [65-79] 68  Resp:  [12-24] 16  BP: (109-180)/() 109/57  SpO2:  [91 %-100  %] 100 %  on  Flow (L/min):  [2] 2;   Device (Oxygen Therapy): room air  Body mass index is 39.81 kg/m².  EXAM  General: Elderly  female in no acute distress.    Head:      Normocephalic and atraumatic.    Eyes:      PERRL/EOM intact, conjunctivae and sclerae clear without nystagmus.    Neck:      No masses, thyromegaly,  trachea central   Lungs:    Clear bilaterally to auscultation.    Heart:      Regular rate and rhythm, no murmur no gallop  Abd:        Soft, nontender, not distended, bowel sounds positive, no shifting dullness.  Msk:        No deformity or scoliosis noted of thoracic or lumbar spine.    Pulses:   Pulses normal in all 4 extremities.    Extremities:        No cyanosis or clubbing-1+ edema.    Neuro:    No focal deficits.   alert oriented x3  Skin:       Intact without lesions or rashes.    Psych:    Alert and cooperative; normal mood and affect; normal attention span       Results Review:  I have personally reviewed most recent Data :  [facility]  CBC    Results from last 7 days   Lab Units 07/01/22  1344 06/30/22  0250   WBC 10*3/mm3 11.10*  --    HEMOGLOBIN g/dL 10.0* 9.8*   PLATELETS 10*3/mm3 328  --      CMP   Results from last 7 days   Lab Units 07/02/22  0202 07/01/22  1344 06/30/22  0544   SODIUM mmol/L 125* 124* 118*   POTASSIUM mmol/L 5.2 5.1 6.3*   CHLORIDE mmol/L 90* 89* 88*   CO2 mmol/L 22.0 22.0 19.0*   BUN mg/dL 22 25* 18   CREATININE mg/dL 1.24* 1.44* 1.26*   GLUCOSE mg/dL 96 72 311*   ALBUMIN g/dL  --  4.10  --    BILIRUBIN mg/dL  --  0.3  --    ALK PHOS U/L  --  59  --    AST (SGOT) U/L  --  23  --    ALT (SGPT) U/L  --  20  --      ABG      XR Shoulder 2+ View Right    Result Date: 6/29/2022  1. Unremarkable appearance of shoulder replacement.  Electronically Signed By-Karie Wang MD On:6/29/2022 3:32 PM This report was finalized on [id number] by  Karie Wang MD.    XR Chest 1 View    Result Date: 7/1/2022   1. Right basilar atelectasis and pulmonary  vascular crowding. No acute cardiopulmonary process is identified. 2. Senescent changes in the bony thorax and thoracic aorta.   Electronically Signed By-Daron Garrett DO On:7/1/2022 1:03 PM This report was finalized on [id number] by  Daron Garrett DO.    CT Angiogram Chest Pulmonary Embolism    Result Date: 7/1/2022   1. There is moderate right hemidiaphragm elevation detention up the right hemithorax which appears new or increased in comparison to the preoperative chest x-ray of 6/17/2022. 2. There is passive atelectatic change within the right mid to lower lung zone. Mild lingular atelectasis. 3. Mild peripheral groundglass densities in the left upper lobe may represent scattered areas of pneumonitis or mild atelectasis. 4. No pulmonary embolism. 5. Surgical changes related to recent right shoulder replacement.    Electronically Signed By-Patricia Bianchi MD On:7/1/2022 4:20 PM This report was finalized on [id number] by  Patricia Bianchi MD.            Assessment & Plan   Assessment and Plan:         DJD of right shoulder    Diaphragm paralysis    ASSESSMENT:  • Hyponatremia  • Abnormal kidney function, creatinine around 1.1 to 1.2 mg/dL, most likely has chronic kidney disease, stage III  • History of hypertension  • History of diabetes mellitus  • Seems like he has mild volume overload  • History of pneumonia, questionable aspiration  • Anemia  • Chronic elevation of right hemidiaphragm  • Gastroesophageal reflux disease  • History of anxiety  • History of irritable bowel syndrome        Plan:     · Renal function seems to be at baseline, underlying chronic kidney disease most likely due to hypertension, diabetes  · Chronic hyponatremia, most likely increased ADH effect, clinically has some increased volume, seems like has hypervolemic hypotonic hyponatremia  · Increase Lasix to 40 mg p.o. twice a day  · Patient on Aldactone, will continue, if no improvement may need to hold  · Continue angiotensin receptor  blockers for now  · We will check urinary studies, urine osmolarity, serum osmolarity, uric acid  · Hemodynamically stable  · Will check renal ultrasound  · May need echocardiogram  · We will continue to follow  · Thanks for this consultation    Note started  by Sahra Ch MD,   [facility] kidney consultant  [phone]

## 2022-07-02 NOTE — H&P
[facility] Medicine Services      Patient Name: Ramona Luis  : 1950  MRN: [id number]  Primary Care Physician:  Efrain Newell MD  Date of admission: 2022    Subjective      Chief Complaint: Shortness of breath     History of Present Illness: Ramona Luis is a 72 y.o. female who presented to [facility] on 2022 complaining of shortness of breath which is significantly worsened over the last 48 hours.  The patient reports that she has been gasping for breath.  The patient has had increased cough with sporadic small amount yellow sputum production.  The patient states she started wheezing earlier today when her breathing is at its worse and she felt like she could not catch her breath.    Review of records with summary: Patient had recent reverse right shoulder repair.  Preop labs included sodium of 118 with corrected sodium 121, potassium 6.3.    Patient reported past medical history: The patient and her  report that the patient was diagnosed with paralyzed right diaphragm when she was in [place] occipitally 2 years ago and diagnosed with pneumonia.  Patient knows of no injury or other known cause of her diaphragm.  The patient reports that she has had multiple abdominal surgeries on that when they took out her gallbladder they found that she did not have 1 and had to cut into her liver to look for it.  The patient reports significant difficulty when she was in [place] controlling her blood pressure which was not controlled until she was put on Lasix and spironolactone.  Patient reports that she was told previously that she had low sodium and she needed to decrease her water intake to two, 20 ounce bottles daily.  Patient has not limited her water intake.      Review of Systems   Cardiovascular: Negative for chest pain.   Respiratory: Positive for cough, shortness of breath, sputum production and wheezing.    Gastrointestinal: Positive for  nausea and vomiting. Negative for abdominal pain.   All other systems reviewed and are negative.      Personal History     Past Medical History:   Diagnosis Date   • Diabetes mellitus (HCC)    • Environmental allergies    • GERD (gastroesophageal reflux disease)    • Hypertension    • Migraine    • Mitral valve prolapse    • Sleep apnea        Past Surgical History:   Procedure Laterality Date   • APPENDECTOMY     • CHOLECYSTECTOMY     • HYSTERECTOMY     • KNEE ARTHROPLASTY Right     x2   • ROTATOR CUFF REPAIR Bilateral    • TOTAL SHOULDER ARTHROPLASTY W/ DISTAL CLAVICLE EXCISION Right 6/29/2022    Procedure: TOTAL SHOULDER REVERSE ARTHROPLASTY;  Surgeon: Connor Briceno MD;  Location: [facility] MAIN OR;  Service: Orthopedics;  Laterality: Right;   • TOTAL SHOULDER REPLACEMENT Left        Family History: family history includes Cancer in her father; Heart failure in her mother. Otherwise pertinent FHx was reviewed and not pertinent to current issue.    Social History:  reports that she quit smoking about 42 years ago. She has never used smokeless tobacco. She reports previous alcohol use. She reports previous drug use.    Home Medications:  Prior to Admission Medications     Prescriptions Last Dose Informant Patient Reported? Taking?    acebutolol (SECTRAL) 200 MG capsule  Self Yes No    Take 200 mg by mouth 2 (Two) Times a Day.    Artificial Tear Solution (Just Tears Eye Drops) solution   Yes No    Apply 1 drop to eye(s) as directed by provider.    B-D UF III MINI PEN NEEDLES 31G X 5 MM misc   Yes No    USE WITH INSULIN INJECTIONS TWICE DAILY    Cetirizine HCl 10 MG capsule  Self Yes No    Take 1 capsule by mouth Daily.    Continuous Blood Gluc  (Dexcom G6 ) device   Yes No    1 (ONE) DEVICE AS DIRECTED    Continuous Blood Gluc Sensor (Dexcom G6 Sensor)   Yes No    1 (ONE) MISC AS DIRECTED    Continuous Blood Gluc Transmit (Dexcom G6 Transmitter) misc   Yes No    See Admin Instructions.     "dexlansoprazole (DEXILANT) 60 MG capsule  Self Yes No    Take 60 mg by mouth Daily.    dicyclomine (BENTYL) 10 MG capsule  Self Yes No    TAKE 1 CAPSULE BY MOUTH TWICE A DAY AFTER MEALS BREAKFAST AND DINNER FOR 30 DAYS    ferrous sulfate 325 (65 FE) MG tablet  Self Yes No    HOLD DOS    furosemide (LASIX) 40 MG tablet   Yes No    Take 20 mg by mouth Daily. HOLD DOS    gabapentin (NEURONTIN) 600 MG tablet  Self Yes No    Take 600 mg by mouth 3 (Three) Times a Day.    glipizide (GLUCOTROL XL) 10 MG 24 hr tablet  Self Yes No    Take 10 mg by mouth Daily. HOLD DOS    glucose blood test strip   Yes No    ONETOUCH ULTRA BLUE STRP    glucose blood test strip   Yes No    OneTouch Ultra Test strips   CHECK FASTING BLOOD SUGAR 4 TIMES A DAY    hydrALAZINE (APRESOLINE) 100 MG tablet  Self Yes No    Take 100 mg by mouth 4 (Four) Times a Day.    HYDROcodone-acetaminophen (Norco) 7.5-325 MG per tablet   No No    Take 1 tablet by mouth Every 6 (Six) Hours As Needed for Moderate Pain .    Insulin Lispro Prot & Lispro (humaLOG 75-25) (75-25) 100 UNIT/ML suspension pen-injector pen   Yes No    16 Units 2 (Two) Times a Day With Meals. HOLD DOS    Insulin Pen Needle (BD Pen Needle Fernie U/F) 32G X 4 MM misc   Yes No    BD PEN NEEDLE FERNIE U/F 32G X 4 MM    Insulin Syringe-Needle U-100 (ReliOn Insulin Syringe) 31G X 15/64\" 0.3 ML misc   Yes No    RELION INSULIN SYRINGE 31G X 15/64\" 0.3 ML    metFORMIN ER (GLUCOPHAGE-XR) 500 MG 24 hr tablet  Self Yes No    Take 1,000 mg by mouth 2 (Two) Times a Day. HOLD 48 hours before surgery    montelukast (SINGULAIR) 10 MG tablet  Self Yes No    Take 10 mg by mouth Daily.    nitroglycerin (NITROSTAT) 0.4 MG SL tablet   Yes No    nitroglycerin 0.4 mg sublingual tablet    nortriptyline (PAMELOR) 75 MG capsule  Self Yes No    Take 75 mg by mouth every night at bedtime.    OneTouch Ultra test strip   Yes No    CHECK FASTING BLOOD SUGAR 4 TIMES A DAY    polyethylene glycol (MIRALAX) 17 GM/SCOOP powder   Yes " No    Take 17 g by mouth Daily As Needed. HOLD DOS    promethazine (PHENERGAN) 12.5 MG tablet   No No    Take 1 tablet by mouth Every 6 (Six) Hours As Needed for Nausea or Vomiting.    spironolactone (ALDACTONE) 50 MG tablet  Self Yes No    Take 50 mg by mouth Daily. HOLD DOS    telmisartan (MICARDIS) 40 MG tablet  Self Yes No    Take 40 mg by mouth 2 (Two) Times a Day. HOLD 24 hours before surgery            Allergies:  Allergies   Allergen Reactions   • Butalbital Nausea And Vomiting   • Codeine Rash   • Erythromycin Nausea And Vomiting   • Metoclopramide Other (See Comments)   • Penicillin G Hives   • Sumatriptan Nausea And Vomiting   • Tyloxapol Nausea And Vomiting   • Zelnorm [Tegaserod] Nausea And Vomiting   • Actos [Pioglitazone] Nausea And Vomiting   • Amoxicillin Hives   • Cymbalta [Duloxetine Hcl] Nausea And Vomiting   • Oxycodone-Acetaminophen Nausea And Vomiting     PATIENT REPORTS PARKINSON'S SYMPTOMS WHILE TAKING   • Sulfa Antibiotics Rash       Objective      Vitals:   Temp:  [97.7 °F (36.5 °C)-99.1 °F (37.3 °C)] 97.7 °F (36.5 °C)  Heart Rate:  [65-79] 79  Resp:  [12-24] 12  BP: (135-180)/() 180/90  Flow (L/min):  [2] 2    Physical Exam  Vitals and nursing note reviewed.   Constitutional:       Appearance: Normal appearance. She is not ill-appearing.   HENT:      Head: Normocephalic and atraumatic.      Right Ear: External ear normal.      Left Ear: External ear normal.      Nose: Nose normal. No congestion or rhinorrhea.      Mouth/Throat:      Mouth: Mucous membranes are moist.   Eyes:      General: No scleral icterus.        Right eye: No discharge.         Left eye: No discharge.      Extraocular Movements: Extraocular movements intact.      Conjunctiva/sclera: Conjunctivae normal.      Pupils: Pupils are equal, round, and reactive to light.   Cardiovascular:      Rate and Rhythm: Normal rate and regular rhythm.      Pulses: Normal pulses.      Heart sounds: Normal heart sounds. No murmur  heard.  Pulmonary:      Effort: Pulmonary effort is normal.      Breath sounds: Decreased air movement present. No stridor. Examination of the right-upper field reveals rales. Examination of the right-middle field reveals decreased breath sounds. Examination of the left-middle field reveals rhonchi. Examination of the right-lower field reveals decreased breath sounds. Examination of the left-lower field reveals rales. Decreased breath sounds, rhonchi and rales present.   Abdominal:      General: Bowel sounds are normal.      Palpations: Abdomen is soft.   Musculoskeletal:      Right shoulder: Tenderness present.      Left shoulder: Normal.      Cervical back: Normal range of motion and neck supple.      Comments: Patient had right total reverse shoulder performed 2 days ago.  The area is covered by dressing and the arm is in immobilizing sling.   Neurological:      General: No focal deficit present.      Mental Status: She is alert and oriented to person, place, and time.   Psychiatric:         Attention and Perception: Attention normal.         Mood and Affect: Mood normal.         Behavior: Behavior normal.         Thought Content: Thought content normal.         Cognition and Memory: Cognition normal. Cognition is not impaired. Memory is not impaired.         Judgment: Judgment normal.         Result Review    Result Review:  I have personally reviewed the results from the time of this admission to 7/1/2022 20:12 EDT and agree with these findings:  [x]  Laboratory  [x]  Microbiology  [x]  Radiology  []  EKG/Telemetry   []  Cardiology/Vascular   []  Pathology  [x]  Old records  []  Other:  Most notable findings include: Elevated right hemidiaphragm; hyponatremia    Assessment & Plan        Active Hospital Problems:  Active Hospital Problems    Diagnosis    • Diaphragm paralysis    • DJD of right shoulder      Plan:     Shortness of breath--likely multifactorial with elevated right hemidiaphragm, anemia, and  possible aspiration pneumonia: Oxygen support as needed; incentive spirometry    Pneumonia with concern for aspiration: IV clindamycin    Hyponatremia, uncertain etiology, moderate, sodium 124--likely secondary to diuretic therapy; consideration for medication induced SIADH: Check urine osmolality, serum osmolality, urine sodium; repeat BMP in a.m.; patient may benefit from nephrology consult; consider discontinuation of spironolactone  -Patient is on Pamelor 75 mg nightly  -Preop sodium level 118 with corrected sodium for hyperglycemia was 121    Anemia, hemoglobin 10.0 with comparison 10.9 6/17/2022: Repeat CBC in a.m.; continue ferrous sulfate; check iron stores    Hypertension, chronic with mitral valve prolapse: Continue Sectral; continue hydralazine at 100 mg 3 times daily; continue Micardis    GERD, chronic: Continue Dexilant with formulary substitution    IBS, chronic: Continue Bentyl    Diabetes type 2, chronic with gastroparesis and diabetic neuropathy: Continue Phenergan as needed; hold metformin; hold Glucotrol; add sliding scale; continue gabapentin    Anxiety, chronic with insomnia: Continue Pamelor    Allergies, chronic: Continue Singulair; continue cetirizine      DVT prophylaxis:  Medical DVT prophylaxis orders are present.    CODE STATUS:    Level Of Support Discussed With: Patient  Code Status (Patient has no pulse and is not breathing): CPR (Attempt to Resuscitate)  Medical Interventions (Patient has pulse or is breathing): Full Support    Admission Status:  I believe this patient meets observation status.    I discussed the patient's findings and my recommendations with patient, family and primary care team.    This patient has been examined wearing appropriate Personal Protective Equipment. 07/01/22      Signature: Electronically signed by POOJA Whittaker, 07/01/22, 10:51 PM EDT.  Patient seen and examined agree with above 72-year-old obese white female with multiple medical problems, a past  history significant for hypertension type 2 diabetes, anxiety's, GERD, IBS, presented to the ER with complaints of shortness of breath.  Which is significantly worsened over the past 48 hours.  The patient reports she has been gasping for breath.  The patient has increased cough, sporadic small amount of yellow sputum.  Patient states she started wheezing earlier today when her breathing is is worse and she felt like she could not catch her breath.  Patient was recently discharged from this hospital after's shoulder surgery.  On exam 72-year-old obese female in bed no acute distress, neck is supple, lungs diminished breath sound, bilateral rhonchi, heart  Regular rhythm normal S1-S2, the abdomen is soft obese nontender non distended, extremities no clubbing or edema, neurological exam no focal deficit, she has right shoulder pain is mobilized.   Assessment and plan -Dyspnea likely multifactorial r elevated right hemidiaphragm, anemia possible aspiration pneumonia, incentive spirometry.  Start IV clindamycin, monitor Pro-Marcin and CBC.  2. Hyponatremia uncertain etiology.  Nephrology was consulted.  Fluid restrict.  3. Anemia hemoglobin 10.  Monitor H&H.  Hypertension chronic.  Continue home meds.  Electronically signed by Grzegorz Garza MD, 07/02/22, 1:13 PM EDT.

## 2022-07-02 NOTE — PROGRESS NOTES
[facility] Medicine Services Daily Progress Note    Patient Name: Ramona Luis  : 1950  MRN: [id number]  Primary Care Physician:  Efrain Newell MD  Date of admission: 2022      Subjective      Chief Complaint: dyspnea    History of Present Illness: Ramona Luis is a 72 y.o. female who presented to [facility] on 2022 complaining of shortness of breath which is significantly worsened over the last 48 hours.  The patient reports that she has been gasping for breath.  The patient has had increased cough with sporadic small amount yellow sputum production.  The patient states she started wheezing earlier today when her breathing is at its worse and she felt like she could not catch her breath.     Review of records with summary: Patient had recent reverse right shoulder repair.  Preop labs included sodium of 118 with corrected sodium 121, potassium 6.3.     Patient reported past medical history: The patient and her  report that the patient was diagnosed with paralyzed right diaphragm when she was in [place] occipitally 2 years ago and diagnosed with pneumonia.  Patient knows of no injury or other known cause of her diaphragm.  The patient reports that she has had multiple abdominal surgeries on that when they took out her gallbladder they found that she did not have 1 and had to cut into her liver to look for it.  The patient reports significant difficulty when she was in [place] controlling her blood pressure which was not controlled until she was put on Lasix and spironolactone.  Patient reports that she was told previously that she had low sodium and she needed to decrease her water intake to two, 20 ounce bottles daily.  Patient has not limited her water intake.      22 patient seen and examined in bed no acute distress, dyspnea improving.  Family at bedside, discussed with RN.  Per  she has been having some hallucinations.  Some  confusion.  Urine test ordered.  Psych consult.    Review of Systems   HENT: Negative.    Eyes: Negative.  Negative for visual halos.   Cardiovascular: Positive for dyspnea on exertion.   Respiratory: Positive for shortness of breath.    Endocrine: Negative.    Hematologic/Lymphatic: Negative.    Skin: Negative.    Musculoskeletal: Positive for arthritis, joint pain and muscle weakness.   Gastrointestinal: Negative.    Genitourinary: Negative.    Neurological: Positive for weakness.   Psychiatric/Behavioral: Positive for altered mental status and hallucinations.   Allergic/Immunologic: Negative.    All other systems reviewed and are negative.         Objective      Vitals:   Temp:  [96.8 °F (36 °C)-99.1 °F (37.3 °C)] 96.8 °F (36 °C)  Heart Rate:  [65-79] 68  Resp:  [12-24] 16  BP: (109-180)/() 109/57  Flow (L/min):  [2] 2    Physical Exam Constitutional:       Appearance: Normal appearance. She is not ill-appearing.   HENT:      Head: Normocephalic and atraumatic.      Right Ear: External ear normal.      Left Ear: External ear normal.      Nose: Nose normal. No congestion or rhinorrhea.      Mouth/Throat:      Mouth: Mucous membranes are moist.   Eyes:      General: No scleral icterus.        Right eye: No discharge.         Left eye: No discharge.      Extraocular Movements: Extraocular movements intact.      Conjunctiva/sclera: Conjunctivae normal.      Pupils: Pupils are equal, round, and reactive to light.   Cardiovascular:      Rate and Rhythm: Normal rate and regular rhythm.      Pulses: Normal pulses.      Heart sounds: Normal heart sounds. No murmur heard.  Pulmonary:      Effort: Pulmonary effort is normal.      Breath sounds: Decreased air movement present. No stridor. Examination of the right-upper field reveals rales. Examination of the right-middle field reveals decreased breath sounds. Examination of the left-middle field reveals rhonchi. Examination of the right-lower field reveals decreased  breath sounds. Examination of the left-lower field reveals rales. Decreased breath sounds, rhonchi and rales present.   Abdominal:      General: Bowel sounds are normal.      Palpations: Abdomen is soft.   Musculoskeletal:      Right shoulder: Tenderness present.      Left shoulder: Normal.      Cervical back: Normal range of motion and neck supple.      Comments: Patient had right total reverse shoulder performed 2 days ago.  The area is covered by dressing and the arm is in immobilizing sling.   Neurological:      General: No focal deficit present.      Mental Status: She is alert and oriented to person, place, and time.   Psychiatric:         Attention and Perception: Attention normal.         Mood and Affect: Mood normal.         Behavior: Behavior normal.         Thought Content: Thought content normal.         Cognition and Memory: Cognition normal. Cognition is not impaired. Memory is not impaired.         Judgment: Judgment normal.              Result Review    Result Review:  I have personally reviewed the results from the time of this admission to 7/2/2022 09:21 EDT and agree with these findings:  []  Laboratory  []  Microbiology  []  Radiology  []  EKG/Telemetry   []  Cardiology/Vascular   []  Pathology  []  Old records  []  Other:  Most notable findings include:   CMP:      Lab 07/02/22  0202 07/01/22  1344 06/30/22  0544   SODIUM 125* 124* 118*   POTASSIUM 5.2 5.1 6.3*   CHLORIDE 90* 89* 88*   CO2 22.0 22.0 19.0*   ANION GAP 13.0 13.0 11.0   BUN 22 25* 18   CREATININE 1.24* 1.44* 1.26*   EGFR 46.0* 38.5* 45.2*   GLUCOSE 96 72 311*   CALCIUM 9.2 8.9 8.1*   IONIZED CALCIUM 1.26  --   --    MAGNESIUM 1.6  --   --    PHOSPHORUS 4.8*  --   --    TOTAL PROTEIN  --  6.6  --    ALBUMIN  --  4.10  --    GLOBULIN  --  2.5  --    ALT (SGPT)  --  20  --    AST (SGOT)  --  23  --    BILIRUBIN  --  0.3  --    ALK PHOS  --  59  --      CBC:      Lab 07/01/22  1344   WBC 11.10*   HEMOGLOBIN 10.0*   HEMATOCRIT 30.7*    PLATELETS 328   NEUTROS ABS 7.70*   LYMPHS ABS 2.00   MONOS ABS 0.90   EOS ABS 0.40   MCV 85.7           Assessment & Plan      Brief Patient Summary:  Ramona Luis is a 72 y.o. female who       cetirizine, 5 mg, Oral, Daily  clindamycin, 300 mg, Intravenous, Q8H  dicyclomine, 10 mg, Oral, TID  enoxaparin, 40 mg, Subcutaneous, Q24H  ferrous sulfate, 324 mg, Oral, Daily With Breakfast  furosemide, 20 mg, Oral, Daily  gabapentin, 600 mg, Oral, TID  hydrALAZINE, 100 mg, Oral, TID  insulin NPH-insulin regular, 15 Units, Subcutaneous, Q12H  ipratropium-albuterol, 3 mL, Nebulization, 4x Daily - RT  losartan, 25 mg, Oral, Q24H  metoprolol tartrate, 50 mg, Oral, Q12H  montelukast, 10 mg, Oral, Daily  nortriptyline, 75 mg, Oral, Nightly  pantoprazole, 40 mg, Oral, QAM  polyethylene glycol, 17 g, Oral, Daily  senna-docusate sodium, 2 tablet, Oral, BID  sodium chloride, 10 mL, Intravenous, Q12H  spironolactone, 50 mg, Oral, Daily       sodium chloride, 50 mL/hr, Last Rate: 50 mL/hr (07/01/22 2052)         Active Hospital Problems:  Active Hospital Problems    Diagnosis    • Diaphragm paralysis    • DJD of right shoulder        Shortness of breath--likely multifactorial with elevated right hemidiaphragm, anemia, and possible aspiration pneumonia: Oxygen support as needed; incentive spirometry     Pneumonia with concern for aspiration: IV clindamycin     Hyponatremia, uncertain etiology, moderate, sodium 124--likely secondary to diuretic therapy; consideration for medication induced SIADH: Check urine osmolality, serum osmolality, urine sodium; repeat BMP in a.m.; patient may benefit from nephrology consult; consider discontinuation of spironolactone  -Patient is on Pamelor 75 mg nightly  -Preop sodium level 118 with corrected sodium for hyperglycemia was 121     Anemia, hemoglobin 10.0 with comparison 10.9 6/17/2022: Repeat CBC in a.m.; continue ferrous sulfate; check iron stores     Hypertension, chronic with mitral valve  prolapse: Continue Sectral; continue hydralazine at 100 mg 3 times daily; continue Micardis     GERD, chronic: Continue Dexilant with formulary substitution     IBS, chronic: Continue Bentyl     Diabetes type 2, chronic with gastroparesis and diabetic neuropathy: Continue Phenergan as needed; hold metformin; hold Glucotrol; add sliding scale; continue gabapentin     Anxiety, chronic with insomnia: Continue Pamelor     Allergies, chronic: Continue Singulair; continue cetirizine     Mental status change, likely TME, psych consulted.    DVT prophylaxis:  Medical DVT prophylaxis orders are present.    CODE STATUS:    Level Of Support Discussed With: Patient  Code Status (Patient has no pulse and is not breathing): CPR (Attempt to Resuscitate)  Medical Interventions (Patient has pulse or is breathing): Full Support      Disposition:  I expect patient to be discharged home.    This patient has been examined wearing appropriate Personal Protective Equipment and discussed with rn. 07/02/22      Electronically signed by Grzegorz Garza MD, 07/02/22, 09:21 EDT.  [facility] Hospitalist Team

## 2022-07-02 NOTE — CASE COMMUNICATION
This patient was readmitted due to sent to ER during SOC visits and readmitted we will need a new referral from this inpatient stay I have cancelled the visit from 7/1/22

## 2022-07-03 VITALS
OXYGEN SATURATION: 93 % | HEART RATE: 86 BPM | SYSTOLIC BLOOD PRESSURE: 137 MMHG | DIASTOLIC BLOOD PRESSURE: 76 MMHG | WEIGHT: 180.34 LBS | HEIGHT: 58 IN | RESPIRATION RATE: 18 BRPM | BODY MASS INDEX: 37.85 KG/M2 | TEMPERATURE: 98.2 F

## 2022-07-03 PROBLEM — E87.1 HYPONATREMIA: Status: ACTIVE | Noted: 2022-07-03

## 2022-07-03 PROBLEM — E66.09 OBESITY DUE TO EXCESS CALORIES: Chronic | Status: ACTIVE | Noted: 2022-07-03

## 2022-07-03 PROBLEM — N28.9 ACUTE RENAL DISEASE: Status: ACTIVE | Noted: 2022-07-03

## 2022-07-03 PROBLEM — R06.02 SHORTNESS OF BREATH: Status: ACTIVE | Noted: 2022-07-03

## 2022-07-03 LAB
ANION GAP SERPL CALCULATED.3IONS-SCNC: 15 MMOL/L (ref 5–15)
APTT PPP: 31.3 SECONDS (ref 61–76.5)
BUN SERPL-MCNC: 29 MG/DL (ref 8–23)
BUN/CREAT SERPL: 28.7 (ref 7–25)
CALCIUM SPEC-SCNC: 9.6 MG/DL (ref 8.6–10.5)
CHLORIDE SERPL-SCNC: 85 MMOL/L (ref 98–107)
CO2 SERPL-SCNC: 24 MMOL/L (ref 22–29)
CREAT SERPL-MCNC: 1.01 MG/DL (ref 0.57–1)
EGFRCR SERPLBLD CKD-EPI 2021: 58.9 ML/MIN/1.73
GLUCOSE BLDC GLUCOMTR-MCNC: 256 MG/DL (ref 70–105)
GLUCOSE SERPL-MCNC: 232 MG/DL (ref 65–99)
MAGNESIUM SERPL-MCNC: 1.2 MG/DL (ref 1.6–2.4)
PHOSPHATE SERPL-MCNC: 4.2 MG/DL (ref 2.5–4.5)
POTASSIUM SERPL-SCNC: 5.2 MMOL/L (ref 3.5–5.2)
QT INTERVAL: 363 MS
SODIUM SERPL-SCNC: 124 MMOL/L (ref 136–145)
URATE SERPL-MCNC: 5.7 MG/DL (ref 2.4–5.7)

## 2022-07-03 PROCEDURE — 94664 DEMO&/EVAL PT USE INHALER: CPT

## 2022-07-03 PROCEDURE — 83735 ASSAY OF MAGNESIUM: CPT | Performed by: INTERNAL MEDICINE

## 2022-07-03 PROCEDURE — 82962 GLUCOSE BLOOD TEST: CPT

## 2022-07-03 PROCEDURE — 94760 N-INVAS EAR/PLS OXIMETRY 1: CPT

## 2022-07-03 PROCEDURE — 99239 HOSP IP/OBS DSCHRG MGMT >30: CPT | Performed by: INTERNAL MEDICINE

## 2022-07-03 PROCEDURE — 63710000001 INSULIN LISPRO (HUMAN) PER 5 UNITS: Performed by: INTERNAL MEDICINE

## 2022-07-03 PROCEDURE — 84550 ASSAY OF BLOOD/URIC ACID: CPT | Performed by: INTERNAL MEDICINE

## 2022-07-03 PROCEDURE — 85730 THROMBOPLASTIN TIME PARTIAL: CPT | Performed by: INTERNAL MEDICINE

## 2022-07-03 PROCEDURE — 94799 UNLISTED PULMONARY SVC/PX: CPT

## 2022-07-03 PROCEDURE — 80048 BASIC METABOLIC PNL TOTAL CA: CPT | Performed by: INTERNAL MEDICINE

## 2022-07-03 PROCEDURE — 84100 ASSAY OF PHOSPHORUS: CPT | Performed by: INTERNAL MEDICINE

## 2022-07-03 RX ORDER — HYDROCODONE BITARTRATE AND ACETAMINOPHEN 7.5; 325 MG/1; MG/1
1 TABLET ORAL EVERY 6 HOURS PRN
Qty: 28 TABLET | Refills: 0 | Status: SHIPPED | OUTPATIENT
Start: 2022-07-03

## 2022-07-03 RX ORDER — DEXTRAN 70/HYPROMELLOSE
1 DROPS OPHTHALMIC (EYE) 3 TIMES DAILY PRN
Qty: 15 ML | Refills: 0 | Status: SHIPPED | OUTPATIENT
Start: 2022-07-03

## 2022-07-03 RX ORDER — POLYETHYLENE GLYCOL 3350 17 G/17G
17 POWDER, FOR SOLUTION ORAL DAILY PRN
Qty: 510 G | Refills: 3 | Status: SHIPPED | OUTPATIENT
Start: 2022-07-03

## 2022-07-03 RX ORDER — MAGNESIUM SULFATE HEPTAHYDRATE 40 MG/ML
2 INJECTION, SOLUTION INTRAVENOUS AS NEEDED
Status: DISCONTINUED | OUTPATIENT
Start: 2022-07-03 | End: 2022-07-03 | Stop reason: HOSPADM

## 2022-07-03 RX ORDER — MAGNESIUM SULFATE 1 G/100ML
1 INJECTION INTRAVENOUS AS NEEDED
Status: DISCONTINUED | OUTPATIENT
Start: 2022-07-03 | End: 2022-07-03 | Stop reason: HOSPADM

## 2022-07-03 RX ORDER — AMOXICILLIN 250 MG
2 CAPSULE ORAL 2 TIMES DAILY
Qty: 60 TABLET | Refills: 0 | Status: SHIPPED | OUTPATIENT
Start: 2022-07-03

## 2022-07-03 RX ORDER — CLINDAMYCIN HYDROCHLORIDE 300 MG/1
300 CAPSULE ORAL 3 TIMES DAILY
Qty: 14 CAPSULE | Refills: 0 | Status: SHIPPED | OUTPATIENT
Start: 2022-07-03

## 2022-07-03 RX ORDER — MAGNESIUM SULFATE HEPTAHYDRATE 40 MG/ML
2 INJECTION, SOLUTION INTRAVENOUS ONCE
Status: DISCONTINUED | OUTPATIENT
Start: 2022-07-03 | End: 2022-07-03 | Stop reason: HOSPADM

## 2022-07-03 RX ORDER — LANOLIN ALCOHOL/MO/W.PET/CERES
400 CREAM (GRAM) TOPICAL DAILY
Qty: 30 TABLET | Refills: 0 | Status: SHIPPED | OUTPATIENT
Start: 2022-07-03

## 2022-07-03 RX ORDER — FERROUS SULFATE 325(65) MG
325 TABLET ORAL
Qty: 30 TABLET | Refills: 0 | Status: SHIPPED | OUTPATIENT
Start: 2022-07-03

## 2022-07-03 RX ORDER — FUROSEMIDE 20 MG/1
20 TABLET ORAL 2 TIMES DAILY
Qty: 60 TABLET | Refills: 0 | Status: SHIPPED | OUTPATIENT
Start: 2022-07-03

## 2022-07-03 RX ADMIN — FUROSEMIDE 40 MG: 40 TABLET ORAL at 08:35

## 2022-07-03 RX ADMIN — METOPROLOL TARTRATE 50 MG: 50 TABLET, FILM COATED ORAL at 08:36

## 2022-07-03 RX ADMIN — GABAPENTIN 300 MG: 600 TABLET, FILM COATED ORAL at 08:35

## 2022-07-03 RX ADMIN — MONTELUKAST 10 MG: 10 TABLET, FILM COATED ORAL at 08:35

## 2022-07-03 RX ADMIN — HYDROCODONE BITARTRATE AND ACETAMINOPHEN 1 TABLET: 7.5; 325 TABLET ORAL at 10:50

## 2022-07-03 RX ADMIN — DICYCLOMINE HYDROCHLORIDE 10 MG: 10 CAPSULE ORAL at 08:35

## 2022-07-03 RX ADMIN — CETIRIZINE HYDROCHLORIDE TABLETS 5 MG: 10 TABLET, FILM COATED ORAL at 08:35

## 2022-07-03 RX ADMIN — Medication 10 ML: at 08:43

## 2022-07-03 RX ADMIN — INSULIN LISPRO 6 UNITS: 100 INJECTION, SOLUTION INTRAVENOUS; SUBCUTANEOUS at 10:51

## 2022-07-03 RX ADMIN — HYDRALAZINE HYDROCHLORIDE 100 MG: 25 TABLET, FILM COATED ORAL at 08:35

## 2022-07-03 RX ADMIN — FERROUS SULFATE TAB EC 324 MG (65 MG FE EQUIVALENT) 324 MG: 324 (65 FE) TABLET DELAYED RESPONSE at 08:35

## 2022-07-03 RX ADMIN — LOSARTAN POTASSIUM 25 MG: 25 TABLET, FILM COATED ORAL at 08:36

## 2022-07-03 RX ADMIN — CLINDAMYCIN PHOSPHATE 300 MG: 300 INJECTION, SOLUTION INTRAVENOUS at 04:26

## 2022-07-03 RX ADMIN — SPIRONOLACTONE 50 MG: 25 TABLET ORAL at 08:35

## 2022-07-03 RX ADMIN — IPRATROPIUM BROMIDE AND ALBUTEROL SULFATE 3 ML: .5; 3 SOLUTION RESPIRATORY (INHALATION) at 07:50

## 2022-07-03 RX ADMIN — SENNOSIDES AND DOCUSATE SODIUM 2 TABLET: 50; 8.6 TABLET ORAL at 08:35

## 2022-07-03 RX ADMIN — PANTOPRAZOLE SODIUM 40 MG: 40 TABLET, DELAYED RELEASE ORAL at 07:05

## 2022-07-03 NOTE — SIGNIFICANT NOTE
07/03/22 1613   OTHER   Discipline physical therapist   Rehab Time/Intention   Session Not Performed patient unavailable for evaluation  (Pt has d/c orders. PT will follow up Tuesday if pt remains.)   Recommendation   PT - Next Appointment 07/05/22

## 2022-07-03 NOTE — PROGRESS NOTES
PROGRESS NOTE      Patient Name: Ramona Luis  : 1950  MRN: [id number]  Primary Care Physician: Efrain Newell MD  Date of admission: 2022    Patient Care Team:  Efrain Newell MD as PCP - General        Subjective No new complaints.    Subjective:     Patient seen and examined  Review of systems:  Mental status normal, no complaints of any chest pain or shortness of breath, no new complaints.      Allergies:    Allergies   Allergen Reactions   • Butalbital Nausea And Vomiting   • Codeine Rash   • Erythromycin Nausea And Vomiting   • Metoclopramide Other (See Comments)   • Penicillin G Hives   • Sumatriptan Nausea And Vomiting   • Tyloxapol Nausea And Vomiting   • Zelnorm [Tegaserod] Nausea And Vomiting   • Actos [Pioglitazone] Nausea And Vomiting   • Amoxicillin Hives   • Cymbalta [Duloxetine Hcl] Nausea And Vomiting   • Oxycodone-Acetaminophen Nausea And Vomiting     PATIENT REPORTS PARKINSON'S SYMPTOMS WHILE TAKING   • Sulfa Antibiotics Rash       Objective   Exam:     Vital Signs  Temp:  [97.7 °F (36.5 °C)-98.3 °F (36.8 °C)] 98.2 °F (36.8 °C)  Heart Rate:  [76-93] 86  Resp:  [16-20] 18  BP: (137-173)/(68-83) 137/76  SpO2:  [91 %-99 %] 93 %  on  Flow (L/min):  [2] 2;   Device (Oxygen Therapy): room air  Body mass index is 37.69 kg/m².    General: Elderly  female in no acute distress.    Head:      Normocephalic and atraumatic.    Eyes:      PERRL/EOM intact, conjunctiva and sclera clear with out nystagmus.    Neck:      No masses, thyromegaly,  trachea central with normal respiratory effort   Lungs:    Clear bilaterally to auscultation.    Heart:      Regular rate and rhythm, no murmur no gallop  Abd:        Soft, nontender, not distended, bowel sounds positive, no shifting dullness   Pulses:   Pulses palpable  Extr:        No cyanosis or clubbing--trace to 1+ edema.    Neuro:    No focal deficits.   alert oriented x3  Skin:       Intact without lesions or rashes.     Psych:    Alert and cooperative; normal mood and affect; .      Results Review:  I have personally reviewed most recent Data :  CBC    Results from last 7 days   Lab Units 07/01/22  1344 06/30/22  0250   WBC 10*3/mm3 11.10*  --    HEMOGLOBIN g/dL 10.0* 9.8*   PLATELETS 10*3/mm3 328  --      CMP   Results from last 7 days   Lab Units 07/03/22  0443 07/02/22  0202 07/01/22  1344 06/30/22  0544   SODIUM mmol/L 124* 125* 124* 118*   POTASSIUM mmol/L 5.2 5.2 5.1 6.3*   CHLORIDE mmol/L 85* 90* 89* 88*   CO2 mmol/L 24.0 22.0 22.0 19.0*   BUN mg/dL 29* 22 25* 18   CREATININE mg/dL 1.01* 1.24* 1.44* 1.26*   GLUCOSE mg/dL 232* 96 72 311*   ALBUMIN g/dL  --   --  4.10  --    BILIRUBIN mg/dL  --   --  0.3  --    ALK PHOS U/L  --   --  59  --    AST (SGOT) U/L  --   --  23  --    ALT (SGPT) U/L  --   --  20  --      ABG      CT Angiogram Chest Pulmonary Embolism    Result Date: 7/1/2022   1. There is moderate right hemidiaphragm elevation FCI up the right hemithorax which appears new or increased in comparison to the preoperative chest x-ray of 6/17/2022. 2. There is passive atelectatic change within the right mid to lower lung zone. Mild lingular atelectasis. 3. Mild peripheral groundglass densities in the left upper lobe may represent scattered areas of pneumonitis or mild atelectasis. 4. No pulmonary embolism. 5. Surgical changes related to recent right shoulder replacement.    Electronically Signed By-Patricia Bianchi MD On:7/1/2022 4:20 PM This report was finalized on [id number] by  Patricia Bianchi MD.        Scheduled Meds:cetirizine, 5 mg, Oral, Daily  clindamycin, 300 mg, Intravenous, Q8H  dicyclomine, 10 mg, Oral, TID  enoxaparin, 40 mg, Subcutaneous, Q24H  ferrous sulfate, 324 mg, Oral, Daily With Breakfast  furosemide, 40 mg, Oral, BID  gabapentin, 300 mg, Oral, TID  hydrALAZINE, 100 mg, Oral, TID  insulin lispro, 0-9 Units, Subcutaneous, TID AC  ipratropium-albuterol, 3 mL, Nebulization, 4x Daily -  RT  losartan, 25 mg, Oral, Q24H  magnesium oxide, 400 mg, Oral, Daily  magnesium sulfate, 2 g, Intravenous, Once  metoprolol tartrate, 50 mg, Oral, Q12H  montelukast, 10 mg, Oral, Daily  nortriptyline, 75 mg, Oral, Nightly  pantoprazole, 40 mg, Oral, QAM  polyethylene glycol, 17 g, Oral, Daily  senna-docusate sodium, 2 tablet, Oral, BID  sodium chloride, 10 mL, Intravenous, Q12H  spironolactone, 50 mg, Oral, Daily  Urea, 15 g, Oral, BID      Continuous Infusions:   PRN Meds:•  senna-docusate sodium **AND** polyethylene glycol **AND** bisacodyl **AND** bisacodyl  •  dextrose  •  dextrose  •  glucagon (human recombinant)  •  HYDROcodone-acetaminophen  •  insulin lispro **AND** insulin lispro  •  magnesium sulfate **OR** magnesium sulfate in D5W 1g/100mL (PREMIX)  •  polyethyl glycol-propyl glycol  •  sodium chloride    Assessment & Plan   Assessment and Plan:         DJD of right shoulder    Diaphragm paralysis    Shortness of breath    Hyponatremia    Acute renal disease    Obesity due to excess calories    ASSESSMENT:  · Hyponatremia  · Abnormal kidney function, creatinine around 1.1 to 1.2 mg/dL, most likely has chronic kidney disease, stage III  · History of hypertension  · History of diabetes mellitus  · Seems like he has mild volume overload  · History of pneumonia, questionable aspiration  · Anemia  · Chronic elevation of right hemidiaphragm  · Gastroesophageal reflux disease  · History of anxiety  · History of irritable bowel syndrome           Plan:      · Renal function seems to be at baseline, underlying chronic kidney disease most likely due to hypertension, diabetes  · Chronic hyponatremia, most likely increased ADH effect, clinically has some increased volume, seems like has hypervolemic hypotonic hyponatremia, no improvement in serum sodium as compared to yesterday  · 1 L free water restriction  · Continue Lasix 40 mg p.o. twice a day  · Continue urea 15 g twice a day  · Discontinue Aldactone  · P  volume and renal function is stable  · Low serum osmolarity, low urine osmolarity could be due to loop diuretics  · No proteinuria  · Hemodynamically stable  · Renal ultrasound is pending  · May need echocardiogram  · Patient is asymptomatic  · We will continue to follow            Electronically signed by Sahra Ch MD,   [facility] kidney consultant  [phone]

## 2022-07-03 NOTE — PLAN OF CARE
"Goal Outcome Evaluation:  Plan of Care Reviewed With: patient, spouse           Outcome Evaluation: Pt A&O*4, refused to take Urea packet and Miralax, education provided but patient still didn't want either, stating that \"I understand it't important, but I can't drink them. It tastes bad!\" Patient's IV access was infiltrated early in the morning, refused to start another one. IV Magnesium replacement and Clindamycin were unable to be given, Dr Garza aware. Oral Magnesium was given. PO antibiotics were ordered. Sodium level was still low, but it was stable. Dr Garza and Dr Ch were notified. Dr Ch wanted another sodium draw, but  was unable to get the blood draw, and patient insisted she go home. Patient is DC home per order.  "

## 2022-07-03 NOTE — PLAN OF CARE
Goal Outcome Evaluation:  Plan of Care Reviewed With: patient           Outcome Evaluation: Pt on room air, family reported episode of hallucinating of seeing invisible figuers and hearing voices. When assessed by nurse, pt was A&O*4, but reported seeing people and hearing them talking. Dr Garza was notified, psych consuslt was ordered. Family was concerned that it was caused by Norco, so Norco will be on hold. Pt has been walking to and from the bathroom with one assistance. Shoulder pain was managed by ice packs. no other complaints. will continue to monitor.

## 2022-07-03 NOTE — PROGRESS NOTES
Called to see the pt as a consult, the pt and her  did not express any significant concerns about pt's confusion and confusion  is already resolved , they decline consult .   Nurse did not report any inappropriate behavior either

## 2022-07-03 NOTE — PLAN OF CARE
Goal Outcome Evaluation:  Plan of Care Reviewed With: patient        Progress: no change  Outcome Evaluation: Pt rested well during the night, pt is still on 2L O2 via NC. Continue using ice packs to manage shoulder pain. Will continue to monitor.

## 2022-07-03 NOTE — DISCHARGE SUMMARY
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     Shortness of breath--likely multifactorial with elevated right hemidiaphragm, anemia, and possible aspiration pneumonia: Oxygen support as needed; incentive spirometry     Pneumonia with concern for aspiration: IV clindamycin     Hyponatremia, uncertain etiology, moderate, sodium 124--likely secondary to diuretic therapy; consideration for medication induced SIADH: Check urine osmolality, serum osmolality, urine sodium; repeat BMP in a.m.; patient may benefit from nephrology consult; consider discontinuation of spironolactone  -Patient is on Pamelor 75 mg nightly  -Preop sodium level 118 with corrected sodium for hyperglycemia was 121     Anemia, hemoglobin 10.0 with comparison 10.9 2022: Repeat CBC in a.m.; continue ferrous sulfate; check iron stores     Hypertension, chronic with mitral valve prolapse: Continue Sectral; continue hydralazine at 100 mg 3 times daily; continue Micardis     GERD, chronic: Continue Dexilant with formulary substitution     IBS, chronic: Continue Bentyl     Diabetes type 2, chronic with gastroparesis and diabetic neuropathy: Continue Phenergan as needed; hold metformin; hold  Glucotrol; add sliding scale; continue gabapentin     Anxiety, chronic with insomnia: Continue Pamelor     Allergies, chronic: Continue Singulair; continue cetirizine     Mental status change, likely TME, psych consulted.    Hospital Course     Hospital Course:  Ramona Luis is a 72 y.o. female who presented to [facility] on 7/1/2022 complaining of shortness of breath which is significantly worsened over the last 48 hours.  The patient reports that she has been gasping for breath.  The patient has had increased cough with sporadic small amount yellow sputum production.  The patient states she started wheezing earlier today when her breathing is at its worse and she felt like she could not catch her breath.     Review of records with summary: Patient had recent reverse right shoulder repair.  Preop labs included sodium of 118 with corrected sodium 121, potassium 6.3.     Patient reported past medical history: The patient and her  report that the patient was diagnosed with paralyzed right diaphragm when she was in [place] occipitally 2 years ago and diagnosed with pneumonia.  Patient knows of no injury or other known cause of her diaphragm.  The patient reports that she has had multiple abdominal surgeries on that when they took out her gallbladder they found that she did not have 1 and had to cut into her liver to look for it.  The patient reports significant difficulty when she was in [place] controlling her blood pressure which was not controlled until she was put on Lasix and spironolactone.  Patient reports that she was told previously that she had low sodium and she needed to decrease her water intake to two, 20 ounce bottles daily.  Patient has not limited her water intake.     7/2/22 patient seen and examined in bed no acute distress, dyspnea improving.  Family at bedside, discussed with RN.  Per  she has been having some hallucinations.  Some confusion.  Urine test ordered.  Psych  consult.  7/3/22 doing better today, will dc home, condition on dc stable.    DISCHARGE Follow Up Recommendations for labs and diagnostics  Follow-up with PCP in a week  Follow-up with nephrology in a week  Monitor BMP closely  Water restriction 1200 cc    Reasons For Change In Medications and Indications for New Medications:  Lasix to twice daily  Hold Aldactone    Day of Discharge     Vital Signs:  Temp:  [97.7 °F (36.5 °C)-98.3 °F (36.8 °C)] 98.2 °F (36.8 °C)  Heart Rate:  [81-93] 86  Resp:  [16-20] 18  BP: (137-173)/(68-83) 137/76  Flow (L/min):  [2] 2      Physical Exam Physical Exam Constitutional:       Appearance: Normal appearance. She is not ill-appearing.   HENT:      Head: Normocephalic and atraumatic.      Right Ear: External ear normal.      Left Ear: External ear normal.      Nose: Nose normal. No congestion or rhinorrhea.      Mouth/Throat:      Mouth: Mucous membranes are moist.   Eyes:      General: No scleral icterus.        Right eye: No discharge.         Left eye: No discharge.      Extraocular Movements: Extraocular movements intact.      Conjunctiva/sclera: Conjunctivae normal.      Pupils: Pupils are equal, round, and reactive to light.   Cardiovascular:      Rate and Rhythm: Normal rate and regular rhythm.      Pulses: Normal pulses.      Heart sounds: Normal heart sounds. No murmur heard.  Pulmonary:      Effort: Pulmonary effort is normal.      Breath sounds: Decreased air movement present. No stridor. Examination of the right-upper field reveals rales. Examination of the right-middle field reveals decreased breath sounds. Examination of the left-middle field reveals rhonchi. Examination of the right-lower field reveals decreased breath sounds. Examination of the left-lower field reveals rales. Decreased breath sounds, rhonchi and rales present.   Abdominal:      General: Bowel sounds are normal.      Palpations: Abdomen is soft.   Musculoskeletal:      Right shoulder: Tenderness present.       Left shoulder: Normal.      Cervical back: Normal range of motion and neck supple.      Comments: Patient had right total reverse shoulder performed 2 days ago.  The area is covered by dressing and the arm is in immobilizing sling.   Neurological:      General: No focal deficit present.      Mental Status: She is alert and oriented to person, place, and time.   Psychiatric:         Attention and Perception: Attention normal.         Mood and Affect: Mood normal.         Behavior: Behavior normal.         Thought Content: Thought content normal.         Cognition and Memory: Cognition normal. Cognition is not impaired. Memory is not impaired.         Judgment: Judgment normal.       Pertinent  and/or Most Recent Results     LAB RESULTS:      Lab 07/03/22  1051 07/02/22  0202 07/01/22  1344 06/30/22  0250   WBC  --   --  11.10*  --    HEMOGLOBIN  --   --  10.0* 9.8*   HEMATOCRIT  --   --  30.7* 29.6*   PLATELETS  --   --  328  --    NEUTROS ABS  --   --  7.70*  --    LYMPHS ABS  --   --  2.00  --    MONOS ABS  --   --  0.90  --    EOS ABS  --   --  0.40  --    MCV  --   --  85.7  --    PROCALCITONIN  --  0.09  --   --    LACTATE  --  1.0  --   --    APTT 31.3*  --   --   --          Lab 07/03/22  0443 07/02/22 2052 07/02/22  0202 07/01/22  1344 06/30/22  0544   SODIUM 124*  --  125* 124* 118*   POTASSIUM 5.2  --  5.2 5.1 6.3*   CHLORIDE 85*  --  90* 89* 88*   CO2 24.0  --  22.0 22.0 19.0*   ANION GAP 15.0  --  13.0 13.0 11.0   BUN 29*  --  22 25* 18   CREATININE 1.01*  --  1.24* 1.44* 1.26*   EGFR 58.9*  --  46.0* 38.5* 45.2*   GLUCOSE 232*  --  96 72 311*   CALCIUM 9.6  --  9.2 8.9 8.1*   IONIZED CALCIUM  --   --  1.26  --   --    MAGNESIUM 1.2*  --  1.6  --   --    PHOSPHORUS 4.2  --  4.8*  --   --    TSH  --  1.310 1.310  --   --          Lab 07/01/22  1344   TOTAL PROTEIN 6.6   ALBUMIN 4.10   GLOBULIN 2.5   ALT (SGPT) 20   AST (SGOT) 23   BILIRUBIN 0.3   ALK PHOS 59         Lab 07/02/22  0202 07/01/22  1344    PROBNP  --  863.5   TROPONIN T <0.010 <0.010             Lab 07/02/22  0202   IRON 25*   IRON SATURATION 7*   TIBC 362   TRANSFERRIN 243         Brief Urine Lab Results  (Last result in the past 365 days)      Color   Clarity   Blood   Leuk Est   Nitrite   Protein   CREAT   Urine HCG        07/02/22 1300 Yellow   Clear   Negative   Negative   Negative   Negative               Microbiology Results (last 10 days)     Procedure Component Value - Date/Time    Respiratory Panel PCR w/COVID-19(SARS-CoV-2) MAGUE/SALLIE/FRANK/PAD/COR/MAD/ZENA In-House, NP Swab in UTM/VTM, 3-4 HR TAT - Swab, Nasopharynx [[id number]]  (Normal) Collected: 07/01/22 1255    Lab Status: Final result Specimen: Swab from Nasopharynx Updated: 07/01/22 1350     ADENOVIRUS, PCR Not Detected     Coronavirus 229E Not Detected     Coronavirus HKU1 Not Detected     Coronavirus NL63 Not Detected     Coronavirus OC43 Not Detected     COVID19 Not Detected     Human Metapneumovirus Not Detected     Human Rhinovirus/Enterovirus Not Detected     Influenza A PCR Not Detected     Influenza B PCR Not Detected     Parainfluenza Virus 1 Not Detected     Parainfluenza Virus 2 Not Detected     Parainfluenza Virus 3 Not Detected     Parainfluenza Virus 4 Not Detected     RSV, PCR Not Detected     Bordetella pertussis pcr Not Detected     Bordetella parapertussis PCR Not Detected     Chlamydophila pneumoniae PCR Not Detected     Mycoplasma pneumo by PCR Not Detected    Narrative:      In the setting of a positive respiratory panel with a viral infection PLUS a negative procalcitonin without other underlying concern for bacterial infection, consider observing off antibiotics or discontinuation of antibiotics and continue supportive care. If the respiratory panel is positive for atypical bacterial infection (Bordetella pertussis, Chlamydophila pneumoniae, or Mycoplasma pneumoniae), consider antibiotic de-escalation to target atypical bacterial infection.    COVID PRE-OP /  PRE-PROCEDURE SCREENING ORDER (NO ISOLATION) - Swab, Nasopharynx [[id number]]  (Normal) Collected: 06/27/22 1604    Lab Status: Final result Specimen: Swab from Nasopharynx Updated: 06/28/22 0213    Narrative:      The following orders were created for panel order COVID PRE-OP / PRE-PROCEDURE SCREENING ORDER (NO ISOLATION) - Swab, Nasopharynx.  Procedure                               Abnormality         Status                     ---------                               -----------         ------                     COVID-19,APTIMA PANTHER(...[[id number]]  Normal              Final result                 Please view results for these tests on the individual orders.    COVID-19,APTIMA PANTHER(LUZ ELENA),[facility]IGNA, NP/OP SWAB IN UTM/VTM/SALINE TRANSPORT MEDIA,24 HR TAT - Swab, Nasopharynx [[id number]]  (Normal) Collected: 06/27/22 1604    Lab Status: Final result Specimen: Swab from Nasopharynx Updated: 06/28/22 0213     COVID19 Not Detected    Narrative:      Fact sheet for providers: https://www.fda.gov/media/484642/download     Fact sheet for patients: https://www.fda.gov/media/464156/download    Test performed by RT PCR.          XR Chest 2 View    Result Date: 6/17/2022  Impression: Mild elevation of the right hemidiaphragm with mild medial right basilar airspace opacities, probably due to atelectasis or chronic change.  No previous chest imaging available for comparison.   Electronically Signed By-Kristen Vega MD On:6/17/2022 12:13 PM This report was finalized on [id number] by  Kristen Vega MD.    XR Shoulder 2+ View Right    Result Date: 6/29/2022  Impression: 1. Unremarkable appearance of shoulder replacement.  Electronically Signed By-Karie Wang MD On:6/29/2022 3:32 PM This report was finalized on [id number] by  Karie Wang MD.    XR Chest 1 View    Result Date: 7/1/2022  Impression:  1. Right basilar atelectasis and pulmonary vascular crowding. No acute cardiopulmonary process is identified. 2.  Senescent changes in the bony thorax and thoracic aorta.   Electronically Signed By-Daron Garrett DO On:7/1/2022 1:03 PM This report was finalized on [id number] by  Daron Garrett DO.    CT Angiogram Chest Pulmonary Embolism    Result Date: 7/1/2022  Impression:  1. There is moderate right hemidiaphragm elevation group home up the right hemithorax which appears new or increased in comparison to the preoperative chest x-ray of 6/17/2022. 2. There is passive atelectatic change within the right mid to lower lung zone. Mild lingular atelectasis. 3. Mild peripheral groundglass densities in the left upper lobe may represent scattered areas of pneumonitis or mild atelectasis. 4. No pulmonary embolism. 5. Surgical changes related to recent right shoulder replacement.    Electronically Signed By-Patricia Bianchi MD On:7/1/2022 4:20 PM This report was finalized on [id number] by  Patricia Bianchi MD.                  Labs Pending at Discharge:  Pending Labs     Order Current Status    Hemoglobin A1c In process          Procedures Performed           Consults:   Consults     Date and Time Order Name Status Description    7/2/2022  8:30 AM Inpatient Nephrology Consult Completed             Discharge Details        Discharge Medications      New Medications      Instructions Start Date   clindamycin 300 MG capsule  Commonly known as: CLEOCIN   300 mg, Oral, 3 Times Daily      Magnesium Oxide 400 (240 Mg) MG tablet   400 mg, Oral, Daily      Stool Softener Plus Laxative 8.6-50 MG per tablet  Generic drug: sennosides-docusate   2 tablets, Oral, 2 Times Daily      Ure-Na 15 g pack packet  Generic drug: Urea   15 g, Oral, 2 Times Daily         Changes to Medications      Instructions Start Date   ferrous sulfate 325 (65 Fe) MG tablet  What changed: See the new instructions.   325 mg, Oral, Daily With Breakfast      furosemide 20 MG tablet  Commonly known as: LASIX  What changed:   · when to take this  · additional instructions   20  mg, Oral, 2 Times Daily      Just Tears Eye Drops solution  What changed:   · when to take this  · reasons to take this   1 drop, Ophthalmic, 3 Times Daily PRN      polyethylene glycol 17 GM/SCOOP powder  Commonly known as: MIRALAX  What changed:   · reasons to take this  · additional instructions   Take 17 g dissolved in 8 oz water by mouth Daily As Needed         Continue These Medications      Instructions Start Date   acebutolol 200 MG capsule  Commonly known as: SECTRAL   200 mg, Oral, 2 Times Daily      acetaminophen 500 MG tablet  Commonly known as: TYLENOL   500 mg, Oral, As Needed      Cetirizine HCl 10 MG capsule   1 capsule, Oral, Daily      dexlansoprazole 60 MG capsule  Commonly known as: DEXILANT   60 mg, Oral, Daily      dicyclomine 10 MG capsule  Commonly known as: BENTYL   TAKE 1 CAPSULE BY MOUTH TWICE A DAY AFTER MEALS BREAKFAST AND DINNER FOR 30 DAYS      ferrous gluconate 324 MG tablet  Commonly known as: FERGON   324 mg, Oral, Daily With Breakfast      gabapentin 600 MG tablet  Commonly known as: NEURONTIN   600 mg, Oral, 3 Times Daily      glipizide 10 MG 24 hr tablet  Commonly known as: GLUCOTROL XL   10 mg, Oral, Daily, HOLD DOS      hydrALAZINE 100 MG tablet  Commonly known as: APRESOLINE   100 mg, Oral, 3 Times Daily      HYDROcodone-acetaminophen 7.5-325 MG per tablet  Commonly known as: NORCO   1 tablet, Oral, Every 6 Hours PRN      HYDROcodone-acetaminophen 7.5-325 MG per tablet  Commonly known as: Norco   1 tablet, Oral, Every 6 Hours PRN      Melatonin 10 MG tablet   10 mg, Oral, Nightly PRN      metFORMIN 1000 MG tablet  Commonly known as: GLUCOPHAGE   1,000 mg, Oral, 2 Times Daily With Meals      montelukast 10 MG tablet  Commonly known as: SINGULAIR   10 mg, Oral, Daily      telmisartan 40 MG tablet  Commonly known as: MICARDIS   40 mg, Oral, 2 Times Daily, HOLD 24 hours before surgery      vitamin D 1.25 MG (15541 UT) capsule capsule  Commonly known as: ERGOCALCIFEROL   50,000  Units, Oral, Weekly         Stop These Medications    diphenhydrAMINE 25 mg capsule  Commonly known as: BENADRYL     nortriptyline 75 MG capsule  Commonly known as: PAMELOR     spironolactone 50 MG tablet  Commonly known as: ALDACTONE            Allergies   Allergen Reactions   • Butalbital Nausea And Vomiting   • Codeine Rash   • Erythromycin Nausea And Vomiting   • Metoclopramide Other (See Comments)   • Penicillin G Hives   • Sumatriptan Nausea And Vomiting   • Tyloxapol Nausea And Vomiting   • Zelnorm [Tegaserod] Nausea And Vomiting   • Actos [Pioglitazone] Nausea And Vomiting   • Amoxicillin Hives   • Cymbalta [Duloxetine Hcl] Nausea And Vomiting   • Oxycodone-Acetaminophen Nausea And Vomiting     PATIENT REPORTS PARKINSON'S SYMPTOMS WHILE TAKING   • Sulfa Antibiotics Rash         Discharge Disposition:   Home or Self Care    Diet:  Hospital:  No active diet order        Discharge Activity:   Activity Instructions     Gradually Increase Activity Until at Pre-Hospitalization Level              CODE STATUS:  Code Status and Medical Interventions:   Ordered at: 07/01/22 2011     Level Of Support Discussed With:    Patient     Code Status (Patient has no pulse and is not breathing):    CPR (Attempt to Resuscitate)     Medical Interventions (Patient has pulse or is breathing):    Full Support         Future Appointments   Date Time Provider Department Center   7/14/2022  2:30 PM Connor Briceno MD [facility]       Additional Instructions for the Follow-ups that You Need to Schedule     Discharge Follow-up with PCP   As directed       Currently Documented PCP:    Efrain Newell MD    PCP Phone Number:    [phone]     Follow Up Details: 1 week         Discharge Follow-up with Specified Provider: renal; 2 Days   As directed      To: renal    Follow Up: 2 Days               Time spent on Discharge including face to face service: 60 minutes    This patient has been examined wearing  appropriate Personal Protective Equipment and discussed with rn. 07/03/22      Signature: Electronically signed by Grzegorz Garza MD, 07/03/22, 12:11 PM EDT.

## 2022-07-04 LAB — QT INTERVAL: 384 MS

## 2022-07-04 NOTE — CASE MANAGEMENT/SOCIAL WORK
Case Management Discharge Note      Final Note: [facility].         Selected Continued Care - Discharged on 7/3/2022 Admission date: 7/1/2022 - Discharge disposition: Home or Self Care       Home Medical Care Coordination complete.    Service Provider Selected Services Address Phone Fax Patient Preferred    [facility]  Home Health Services [address] [facility] [place] [address] [phone] --           Transportation Services  Private: Car    Final Discharge Disposition Code: 06 - home with home health care

## 2022-07-05 ENCOUNTER — HOME HEALTH ADMISSION (OUTPATIENT)
Dept: HOME HEALTH SERVICES | Facility: HOME HEALTHCARE | Age: 72
End: 2022-07-05

## 2022-07-05 ENCOUNTER — TRANSCRIBE ORDERS (OUTPATIENT)
Dept: HOME HEALTH SERVICES | Facility: HOME HEALTHCARE | Age: 72
End: 2022-07-05

## 2022-07-05 DIAGNOSIS — R06.02 SOB (SHORTNESS OF BREATH) ON EXERTION: Primary | ICD-10-CM

## 2022-07-05 LAB — HBA1C MFR BLD: 8.6 % (ref 3.5–5.6)

## 2022-07-06 ENCOUNTER — HOME CARE VISIT (OUTPATIENT)
Dept: HOME HEALTH SERVICES | Facility: HOME HEALTHCARE | Age: 72
End: 2022-07-06

## 2022-07-06 VITALS
HEART RATE: 62 BPM | SYSTOLIC BLOOD PRESSURE: 104 MMHG | RESPIRATION RATE: 18 BRPM | HEIGHT: 68 IN | BODY MASS INDEX: 27.42 KG/M2 | TEMPERATURE: 98.2 F | OXYGEN SATURATION: 92 % | DIASTOLIC BLOOD PRESSURE: 40 MMHG

## 2022-07-06 NOTE — HOME HEALTH
"Patients states goal for home health \"regain independance when it comes to ADL care\" Patient is unable to supinate her left hand and spouse is very concerned that this last hospital stay has caused her to miss a whole week of PT. Patient reports she is no seeing Dr Beba Romo's office.    Patients spouse reports the only services that they require from  is the PT to get her shoulder moving. Refuse any further SN services unless the PT feels there is a need."

## 2022-07-07 ENCOUNTER — HOME CARE VISIT (OUTPATIENT)
Dept: HOME HEALTH SERVICES | Facility: HOME HEALTHCARE | Age: 72
End: 2022-07-07

## 2022-07-07 VITALS
DIASTOLIC BLOOD PRESSURE: 55 MMHG | TEMPERATURE: 97.8 F | HEART RATE: 69 BPM | SYSTOLIC BLOOD PRESSURE: 115 MMHG | RESPIRATION RATE: 18 BRPM | OXYGEN SATURATION: 93 %

## 2022-07-07 PROCEDURE — G0151 HHCP-SERV OF PT,EA 15 MIN: HCPCS

## 2022-07-08 ENCOUNTER — HOME CARE VISIT (OUTPATIENT)
Dept: HOME HEALTH SERVICES | Facility: HOME HEALTHCARE | Age: 72
End: 2022-07-08

## 2022-07-08 VITALS — SYSTOLIC BLOOD PRESSURE: 135 MMHG | HEART RATE: 79 BPM | DIASTOLIC BLOOD PRESSURE: 60 MMHG | OXYGEN SATURATION: 99 %

## 2022-07-08 PROCEDURE — G0152 HHCP-SERV OF OT,EA 15 MIN: HCPCS

## 2022-07-08 NOTE — HOME HEALTH
"Assessment/Referral:  Pt is a 73 y/o female pt of Dr. Briceno s/p R reverse total shoulder replacement on 06/29/22.  Pt discharged home 06/30/22 but returned to ED on 07/01/22 due to SOA and inability to catch breath.  Pt discharged home 07/03/22 with no new orders.  Pt has since seen PCP for review of medications with no new questions or concerns.  PT evaluated pt's mobility/balance and noted pt to ambulate with impaired endurance, impaired balance, SOA and pain in RUE with mobility.  Pt will benefit from continued skilled home care PT for assessment of generalized mobility and balance.  Pt will benefit from OT evaluation for management of rehab related to R reverse TSA.    Primary Focus of Care:  impaired mobility related to R reverse total shoulder replacement    PLOF/Environment:  pt previously independent with all mobility and self care without AD.  Pt lives with  in single story home with steps to enter without rail    Pt's Personal Goal:  Pt reports her primary goal for home care is to \"improve mobility in my arm and be able to use the bathroom without help\"    Homebound reason(s):  Pt is homebound due to considerable taxing effort to leave home including:  pain, limited RUE ROM, decreased endurance, SOA"

## 2022-07-08 NOTE — HOME HEALTH
Pt dgter answered the door. Pt lives with  who is retired live in 2 story house, pt's bedroom on first.  Pt sitting on couch under blanket reports not feeling well,since last night,  has HA , like a vice aournd it, hx of migraines, and having chills. Pt does have seasonal allergies takes medications for them. Pt LUE dominate, but has poor strength jodee proximally in LUE.   Pt had R Reverse Shoulder Replacment  surgery on 6/28/22.d/c home 6/30 then next day  Went back to ER due to due to trouble breathing, admission diagonses Dysphea due to mulitfactorial possibly due to aspiration PNA, R side diaphragm paralysis, hypoatremia possibly due to directic therapy, AKD. Pt reports  has had whelps on body in last month, Dr not sure why but pt has very sensitive skin. PMHx: DMII, neuropathy, HTN, GERD, anemia       PLOF: Pt was Indep with all ADL/IADL's,drove and assisted  with all homemaking.Pt could not do stairs due to OA in both knees, R knee revision and replacement 3 yrs ago.     AE/DME:  Pt has walk in shower,  preset during shower, textured floor with HHSH.  HH toilet.      Currently, husbnad A with dressing and bathing, max A, min A with grooming, toilet transfer CGA, toileting mod A, D with wiping at BM . D with all homemaking. Pt performing only 3-5 reps of PROM flex, abd, ER with mod pain. and is havign mod diff performing pendulum ex's for RUE with mod verbal/tactile/visual cues. See Interventions. LUE shoulder is weak with decreased AROM.     Pt reports she is doing RUE ex's 1-2 X day.  each 5-7 reps     Pt goal for OT RUE recovery movement so she can be Indep with ADL/IADL's    It is recom  due to pt's comoridities and complications after surgery pt have OT 2xwk for 4wks for RUE's ther-ex per Dr Paz's orders, ADL retraining, fx transfers,proper breathing, E conservation/work simplic, falls prevention,  pt/Cg ed and training. Pt is in agreement with OT POC.

## 2022-07-11 ENCOUNTER — TELEPHONE (OUTPATIENT)
Dept: ORTHOPEDIC SURGERY | Facility: CLINIC | Age: 72
End: 2022-07-11

## 2022-07-11 NOTE — TELEPHONE ENCOUNTER
Caller: Simon Luis    Relationship: Emergency Contact    Best call back number: [phone]    What orders are you requesting (i.e. lab or imaging):  CALLED INSURANCE- IS UPSET THAT 12 VISITS WERE ORDERED FOR SKILLED NURSING TO COME AND ONE 2 VISITS FOR PHYSICAL THERAPY/ OCCUPATIONAL THERAPY TO COME.     PATIENT HAS NOT BEEN GETTING ENOUGH MOVEMENT AND HAVING A LOT OF DIFFICULTY WITH MOTION -  THINKS SHE NEEDS TO HAVE MULTIPLE THERAPY SESSIONS PER WEEK-     PLEASE CALL AND ADVISE- HAD MULTIPLE QUESTIONS ABOUT THE DIFFERENCE IN THE THERAPY OPTIONS AND WHY THERE WAS SO MANY SKILLED NURSING APPOINTMENTS ORDERED -      THEY DID NOT WANT TO WAIT TO DISCUSS OPTIONS IN PERSON AT PATIENTS APPT 7.14.22

## 2022-07-12 ENCOUNTER — HOME CARE VISIT (OUTPATIENT)
Dept: HOME HEALTH SERVICES | Facility: HOME HEALTHCARE | Age: 72
End: 2022-07-12

## 2022-07-12 PROCEDURE — G0180 MD CERTIFICATION HHA PATIENT: HCPCS | Performed by: ORTHOPAEDIC SURGERY

## 2022-07-14 ENCOUNTER — HOME CARE VISIT (OUTPATIENT)
Dept: HOME HEALTH SERVICES | Facility: HOME HEALTHCARE | Age: 72
End: 2022-07-14

## 2022-07-14 ENCOUNTER — OFFICE VISIT (OUTPATIENT)
Dept: ORTHOPEDIC SURGERY | Facility: CLINIC | Age: 72
End: 2022-07-14

## 2022-07-14 VITALS
HEIGHT: 68 IN | HEART RATE: 75 BPM | BODY MASS INDEX: 27.28 KG/M2 | SYSTOLIC BLOOD PRESSURE: 122 MMHG | WEIGHT: 180 LBS | DIASTOLIC BLOOD PRESSURE: 70 MMHG

## 2022-07-14 DIAGNOSIS — Z47.89 ORTHOPEDIC AFTERCARE: Primary | ICD-10-CM

## 2022-07-14 PROCEDURE — 99024 POSTOP FOLLOW-UP VISIT: CPT | Performed by: ORTHOPAEDIC SURGERY

## 2022-07-14 RX ORDER — HYDROXYZINE HYDROCHLORIDE 25 MG/1
25 TABLET, FILM COATED ORAL 3 TIMES DAILY PRN
COMMUNITY

## 2022-07-14 NOTE — PATIENT INSTRUCTIONS
Shoulder Arthroplasty: Post- Operative Visit Objectives    Review the operative findings, procedures and photos.  Make sure medications are effective and not causing problems.  Pain: Oxycodone or hydrocodone is for pain. You may take 1 tablet every 6 hours as necessary.  Some patients don’t require the use of these…in those circumstances just use Tylenol extra-strength 1 or 2 tablets every 4-6 hours.   NSAIDs. For pain and inflammation.  You can take an over the counter anti-inflammatory of choice such as Aleve, Ibuprofen, Motrin or Advil during this time.  If you have had any problems stop taking these medicines and please tell us!  Wound Care:  Today we will remove your dressings.  There may be some residual glue on your incision.  It is now ok to shower without covering it. Please avoid submerging the incision for another two weeks.  Continue ice pack as needed.  Exercises and Physical Therapy   Continue your physical therapy and daily home exercises focusing especially on overhead motion.  Continue the sling and remove for activities such as showering and bringing you hand to your face or hair, no motion behind your back for the next 4 weeks.  Some shoulder replacements with a rotator cuff repair will have more restrictions and we will go over those.   Follow Up appointments Schedule Follow up visits as directed usually in 4 weeks..  Notes  Make sure you have all necessary notes and documentation for school or work.  Issues: Remember our goal is to make this process smooth and easy if there is any thing you need please ask us or call back [phone]

## 2022-07-14 NOTE — PROGRESS NOTES
Patient ID: Ramona Luis is a 72 y.o. female.    6/29/22 right reverse total shoulder with subscapularis repair  Has not been getting much actual physical therapy  Objective:    There were no vitals taken for this visit.    Physical Examination:    Incision well approximated no sign of infection mild bruising  Sensory and motor exam are intact all distributions. Radial pulse is palpable and capillary refill is less than two seconds to all digits.    Imaging:  right Shoulder X-Ray  Indication: Postop total shoulder  AP Y and Lateral views  Findings: Reverse total shoulder in position  no bony lesion  Soft tissues normal  not examined joint spaces  Hardware appropriately positioned yes      yes prior studies available for comparison    Assessment:  Doing well after total shoulder    Plan:  I gave her my total shoulder protocol and demonstrated her home exercises otherwise continue her sling see me in a month

## 2022-07-15 ENCOUNTER — HOME CARE VISIT (OUTPATIENT)
Dept: HOME HEALTH SERVICES | Facility: HOME HEALTHCARE | Age: 72
End: 2022-07-15

## 2022-07-15 PROCEDURE — G0152 HHCP-SERV OF OT,EA 15 MIN: HCPCS

## 2022-07-17 VITALS — SYSTOLIC BLOOD PRESSURE: 130 MMHG | HEART RATE: 72 BPM | OXYGEN SATURATION: 97 % | DIASTOLIC BLOOD PRESSURE: 60 MMHG

## 2022-07-17 NOTE — HOME HEALTH
Pt sitting up in recliner upon therapist arrival.  Therapy focused on ther ex.  FOr activity see interventions.        Pt denies any falls      Next visit ther ex and adl training

## 2022-07-18 ENCOUNTER — HOME CARE VISIT (OUTPATIENT)
Dept: HOME HEALTH SERVICES | Facility: HOME HEALTHCARE | Age: 72
End: 2022-07-18

## 2022-07-18 VITALS
RESPIRATION RATE: 18 BRPM | SYSTOLIC BLOOD PRESSURE: 120 MMHG | TEMPERATURE: 98.1 F | HEART RATE: 71 BPM | DIASTOLIC BLOOD PRESSURE: 50 MMHG | OXYGEN SATURATION: 96 %

## 2022-07-18 PROCEDURE — G0151 HHCP-SERV OF PT,EA 15 MIN: HCPCS

## 2022-07-18 NOTE — HOME HEALTH
Pt reports feeling well this date and states she had a headache this morning.  Pt rates pain in shoulder varies and today is 4/10.  Pt tolerated all mobility and ther ex this date.  Pt met all necessary goals with PT.  Pt to continue with skilled OT services for recovery of R TSR.  Pt agreeable and prepared for discharge from skilled PT services at this time

## 2022-07-19 ENCOUNTER — HOME CARE VISIT (OUTPATIENT)
Dept: HOME HEALTH SERVICES | Facility: HOME HEALTHCARE | Age: 72
End: 2022-07-19

## 2022-07-19 VITALS — SYSTOLIC BLOOD PRESSURE: 130 MMHG | HEART RATE: 68 BPM | OXYGEN SATURATION: 96 % | DIASTOLIC BLOOD PRESSURE: 60 MMHG

## 2022-07-19 PROCEDURE — G0152 HHCP-SERV OF OT,EA 15 MIN: HCPCS

## 2022-07-21 NOTE — HOME HEALTH
Pt sitting up in rocker in sunroom upon therapist arrival.  Therapy focused on ther ex and adl training.  For activity see interventions.      Pt denies any falls        Next visit ther ex and adl training

## 2022-07-22 ENCOUNTER — TELEPHONE (OUTPATIENT)
Dept: ORTHOPEDIC SURGERY | Facility: CLINIC | Age: 72
End: 2022-07-22

## 2022-07-22 ENCOUNTER — HOME CARE VISIT (OUTPATIENT)
Dept: HOME HEALTH SERVICES | Facility: HOME HEALTHCARE | Age: 72
End: 2022-07-22

## 2022-07-22 PROCEDURE — G0152 HHCP-SERV OF OT,EA 15 MIN: HCPCS

## 2022-07-22 NOTE — TELEPHONE ENCOUNTER
PATIENT WOULD LIKE A REFILL OF HER MEDICATION HYDROCODONE TO THE [facility] ON [address] THAT IS ON FILE. SHE HAS ENOUGH FOR THE WEEKEND. PLEASE ADVISE.

## 2022-07-24 VITALS — OXYGEN SATURATION: 96 % | HEART RATE: 71 BPM

## 2022-07-24 NOTE — HOME HEALTH
Pt sitting up in recliner in livingroom upon therapist arrival.  Therapy focused on ther ex and adl training.  For activity see interventions.      Pt denies any falls      Next visit ther ex and adl training.

## 2022-07-25 DIAGNOSIS — M19.011 OSTEOARTHRITIS OF RIGHT GLENOHUMERAL JOINT: Primary | ICD-10-CM

## 2022-07-25 RX ORDER — HYDROCODONE BITARTRATE AND ACETAMINOPHEN 5; 325 MG/1; MG/1
1 TABLET ORAL EVERY 6 HOURS PRN
Qty: 28 TABLET | Refills: 0 | Status: SHIPPED | OUTPATIENT
Start: 2022-07-25 | End: 2022-08-15 | Stop reason: SDUPTHER

## 2022-07-25 NOTE — TELEPHONE ENCOUNTER
PT CALLING IN TO CHECK ON THIS REFILL , PT HAS ONLY ONE PILL LEFT. PLEASE SEND REFILL IN ASAP , TY!   PT CALL BACK PH#[phone]

## 2022-07-26 ENCOUNTER — OFFICE (AMBULATORY)
Dept: URBAN - METROPOLITAN AREA CLINIC 64 | Facility: CLINIC | Age: 72
End: 2022-07-26

## 2022-07-26 ENCOUNTER — HOME CARE VISIT (OUTPATIENT)
Dept: HOME HEALTH SERVICES | Facility: HOME HEALTHCARE | Age: 72
End: 2022-07-26

## 2022-07-26 VITALS — WEIGHT: 184 LBS | HEIGHT: 59 IN

## 2022-07-26 DIAGNOSIS — R15.9 FULL INCONTINENCE OF FECES: ICD-10-CM

## 2022-07-26 DIAGNOSIS — K59.00 CONSTIPATION, UNSPECIFIED: ICD-10-CM

## 2022-07-26 PROCEDURE — 99213 OFFICE O/P EST LOW 20 MIN: CPT | Performed by: NURSE PRACTITIONER

## 2022-07-26 PROCEDURE — G0152 HHCP-SERV OF OT,EA 15 MIN: HCPCS

## 2022-07-27 VITALS — SYSTOLIC BLOOD PRESSURE: 124 MMHG | HEART RATE: 62 BPM | OXYGEN SATURATION: 97 % | DIASTOLIC BLOOD PRESSURE: 64 MMHG

## 2022-07-28 ENCOUNTER — HOME CARE VISIT (OUTPATIENT)
Dept: HOME HEALTH SERVICES | Facility: HOME HEALTHCARE | Age: 72
End: 2022-07-28

## 2022-07-28 NOTE — HOME HEALTH
Pt sitting up in chair in sunroom upon therapist arrival.  Therapy focused on ther ex and adl training.  For activity see interventions.      Pt denies any falls    See med list for med changes      Next visit ther ex and adl training

## 2022-08-01 ENCOUNTER — HOME CARE VISIT (OUTPATIENT)
Dept: HOME HEALTH SERVICES | Facility: HOME HEALTHCARE | Age: 72
End: 2022-08-01

## 2022-08-01 VITALS — SYSTOLIC BLOOD PRESSURE: 120 MMHG | OXYGEN SATURATION: 97 % | HEART RATE: 73 BPM | DIASTOLIC BLOOD PRESSURE: 60 MMHG

## 2022-08-01 PROCEDURE — G0152 HHCP-SERV OF OT,EA 15 MIN: HCPCS

## 2022-08-03 NOTE — HOME HEALTH
Pt greeted therapist at door upon therapist arrival.  Therapy focused on ther ex  For acitivity see interventions.      Pt denies any falls or med changes      Next visit ther ex

## 2022-08-04 ENCOUNTER — TELEPHONE (OUTPATIENT)
Dept: ORTHOPEDIC SURGERY | Facility: CLINIC | Age: 72
End: 2022-08-04

## 2022-08-04 DIAGNOSIS — M19.011 OSTEOARTHRITIS OF RIGHT GLENOHUMERAL JOINT: Primary | ICD-10-CM

## 2022-08-04 RX ORDER — HYDROCODONE BITARTRATE AND ACETAMINOPHEN 5; 325 MG/1; MG/1
1 TABLET ORAL EVERY 6 HOURS PRN
Qty: 20 TABLET | Refills: 0 | Status: SHIPPED | OUTPATIENT
Start: 2022-08-04 | End: 2022-08-22 | Stop reason: SDUPTHER

## 2022-08-04 NOTE — TELEPHONE ENCOUNTER
Caller: PATIENT   Relationship: SELF     Best call back number: [phone]    Requested Prescriptions: HYDROCODONE 5MGS   Requested Prescriptions      No prescriptions requested or ordered in this encounter        Pharmacy where request should be sent: [facility] ON [facility]      Additional details provided by patient: THE PATIENT STATES HER WORST PAIN IS AT BEDTIME AND GETTING UP IN THE MORNING.   Does the patient have less than a 3 day supply:  [x] Yes  [] No    Kesha Zhu Rep   08/04/22 14:28 EDT

## 2022-08-05 ENCOUNTER — HOME CARE VISIT (OUTPATIENT)
Dept: HOME HEALTH SERVICES | Facility: HOME HEALTHCARE | Age: 72
End: 2022-08-05

## 2022-08-05 PROCEDURE — G0152 HHCP-SERV OF OT,EA 15 MIN: HCPCS

## 2022-08-07 VITALS — SYSTOLIC BLOOD PRESSURE: 130 MMHG | DIASTOLIC BLOOD PRESSURE: 64 MMHG | HEART RATE: 67 BPM | OXYGEN SATURATION: 99 %

## 2022-08-11 ENCOUNTER — OFFICE VISIT (OUTPATIENT)
Dept: ORTHOPEDIC SURGERY | Facility: CLINIC | Age: 72
End: 2022-08-11

## 2022-08-11 VITALS — BODY MASS INDEX: 27.28 KG/M2 | HEART RATE: 74 BPM | WEIGHT: 180 LBS | OXYGEN SATURATION: 98 % | HEIGHT: 68 IN

## 2022-08-11 DIAGNOSIS — Z47.89 ORTHOPEDIC AFTERCARE: Primary | ICD-10-CM

## 2022-08-11 PROCEDURE — 99024 POSTOP FOLLOW-UP VISIT: CPT | Performed by: PHYSICIAN ASSISTANT

## 2022-08-11 RX ORDER — PROCHLORPERAZINE 25 MG/1
SUPPOSITORY RECTAL
COMMUNITY
Start: 2022-07-24

## 2022-08-11 RX ORDER — BLOOD SUGAR DIAGNOSTIC
STRIP MISCELLANEOUS
COMMUNITY
Start: 2022-08-04

## 2022-08-11 NOTE — PROGRESS NOTES
" Patient ID: Ramona Luis is a 72 y.o. female presenting with her  for follow-up on 6/29/22 right reverse total shoulder with subscapularis repair.  She reports decreasing her opioid use to 1 tablet/day. Reports some soreness of the evening.  She continues to have soreness with the extremes of her range of motion.  She reports being discharged from home occupational therapy on 8/5/2022.  Patient does not believe that she has returned to her baseline like she had following her left total shoulder replacement.  We will place an order for physical therapy to continue for at least 4 more visits.    Objective:  Pulse 74   Ht 172.7 cm (68\")   Wt 81.6 kg (180 lb)   SpO2 98%   BMI 27.37 kg/m²     Physical Examination:  Neurovascular intact.  Surgical site is well approximated with no signs of infection.  Immediately distal to the surgical site is an area of erythema with scaling.   Utilized suture removal kit to remove Monocryl filament, cleansed area with alcohol pad and covered with a Band-Aid.    Forward flexion 95, abduction 85, external rotation 10, internal rotation 30    Imaging:   None to review at this time.    Assessment:   Diagnoses and all orders for this visit:    1. Orthopedic aftercare (Primary)        Plan: May discontinue sling.  Continue with formal physical therapy to increase range of motion as well as strengthening the right shoulder joint.  Encouraged daily range of motion retches at home.  Follow-up in 3 months with Dr. Briceno.        Disclaimer: Part of this note may be an electronic transcription/translation of spoken language to printed text using the Dragon Dictation System  "

## 2022-08-15 ENCOUNTER — OFFICE VISIT (OUTPATIENT)
Dept: ORTHOPEDIC SURGERY | Facility: CLINIC | Age: 72
End: 2022-08-15

## 2022-08-15 VITALS — OXYGEN SATURATION: 98 % | HEIGHT: 68 IN | BODY MASS INDEX: 27.28 KG/M2 | WEIGHT: 180 LBS | HEART RATE: 68 BPM
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
## 2022-08-15 DIAGNOSIS — S42.334A CLOSED NONDISPLACED OBLIQUE FRACTURE OF SHAFT OF RIGHT HUMERUS, INITIAL ENCOUNTER: Primary | ICD-10-CM

## 2022-08-15 DIAGNOSIS — M19.011 OSTEOARTHRITIS OF RIGHT GLENOHUMERAL JOINT: ICD-10-CM

## 2022-08-15 DIAGNOSIS — Z47.89 ORTHOPEDIC AFTERCARE: ICD-10-CM

## 2022-08-15 PROCEDURE — 97760 ORTHOTIC MGMT&TRAING 1ST ENC: CPT | Performed by: PHYSICIAN ASSISTANT

## 2022-08-15 PROCEDURE — 99213 OFFICE O/P EST LOW 20 MIN: CPT | Performed by: PHYSICIAN ASSISTANT

## 2022-08-15 RX ORDER — HYDROCODONE BITARTRATE AND ACETAMINOPHEN 5; 325 MG/1; MG/1
1 TABLET ORAL EVERY 6 HOURS PRN
Qty: 28 TABLET | Refills: 0 | Status: SHIPPED | OUTPATIENT
Start: 2022-08-15

## 2022-08-15 NOTE — PROGRESS NOTES
" Patient ID: Ramona Luis is a 72 y.o. female presenting for follow-up after experiencing a fall on 8/12/2022 and believes she fractured her right arm. Since the fall she reports sharp pain along the upper arm. Despite the injury she has continued her HEP: ie stretches and exercises in the meantime. At times she reports the pain being a 10/10. Since the injury, she's had stiffness in the right hand and difficulty making a fist.    Objective:  Pulse 68   Ht 172.7 cm (68\")   Wt 81.6 kg (180 lb)   SpO2 98%   BMI 27.37 kg/m²     Physical Examination:  Intact skin. Incision is well healed with no signs of infection.     Decreased  strength in right hand. Neurovascularly intact.        Imaging:   XR Shoulder 2+ View Right (08/15/2022 10:06)    Indication: History shoulder replacement with a new fall  AP, Y and Lateral views  Findings: Reverse total shoulder in position with nondisplaced spiral fracture of the humerus at the distal third of the stem extending past the stem.  No bony lesion  Soft tissues normal  not examined joint spaces  Hardware appropriately positioned yes     Yes, prior studies available for comparison.     X-RAY was ordered and reviewed by Connor Briceno MD    Assessment:   Diagnoses and all orders for this visit:    1. Closed nondisplaced oblique fracture of shaft of right humerus, initial encounter (Primary)  -     HYDROcodone-acetaminophen (NORCO) 5-325 MG per tablet; Take 1 tablet by mouth Every 6 (Six) Hours As Needed for Moderate Pain .  Dispense: 28 tablet; Refill: 0    2. Accidental fall, initial encounter  -     XR Shoulder 2+ View Right    3. Orthopedic aftercare    4. Osteoarthritis of right glenohumeral joint  -     HYDROcodone-acetaminophen (NORCO) 5-325 MG per tablet; Take 1 tablet by mouth Every 6 (Six) Hours As Needed for Moderate Pain .  Dispense: 28 tablet; Refill: 0      Plan: Discontinue HEP rehabilitation exercises for TSA. Hydrocodone prescribed for severe pain. " Immobilize with luna brace and sling 23 hours a day. Follow up in one week for x-ray.     Greater than 15 min was spent applying the brace, ensuring proper fit and answering any questions associated with use of the luna brace and sling combination.       Disclaimer: Part of this note may be an electronic transcription/translation of spoken language to printed text using the Dragon Dictation System

## 2022-08-22 ENCOUNTER — OFFICE VISIT (OUTPATIENT)
Dept: ORTHOPEDIC SURGERY | Facility: CLINIC | Age: 72
End: 2022-08-22

## 2022-08-22 VITALS — BODY MASS INDEX: 27.28 KG/M2 | HEIGHT: 68 IN | WEIGHT: 180 LBS | HEART RATE: 69 BPM

## 2022-08-22 DIAGNOSIS — S42.334G: ICD-10-CM

## 2022-08-22 DIAGNOSIS — M19.011 OSTEOARTHRITIS OF RIGHT GLENOHUMERAL JOINT: ICD-10-CM

## 2022-08-22 DIAGNOSIS — Z47.89 ORTHOPEDIC AFTERCARE: Primary | ICD-10-CM

## 2022-08-22 PROCEDURE — 99024 POSTOP FOLLOW-UP VISIT: CPT | Performed by: PHYSICIAN ASSISTANT

## 2022-08-22 RX ORDER — HYDROCODONE BITARTRATE AND ACETAMINOPHEN 5; 325 MG/1; MG/1
1 TABLET ORAL EVERY 6 HOURS PRN
Qty: 20 TABLET | Refills: 0 | Status: SHIPPED | OUTPATIENT
Start: 2022-08-22 | End: 2022-11-03

## 2022-08-22 RX ORDER — INSULIN LISPRO 100 [IU]/ML
INJECTION, SUSPENSION SUBCUTANEOUS
COMMUNITY
Start: 2022-08-18

## 2022-08-22 NOTE — PROGRESS NOTES
"   Patient ID: Ramona Luis is a 72 y.o. female presents with her  for follow up on closed nondisplaced oblique fracture of shaft of right humerus.  She reports pain is unchanged since her initial evaluation. Continues taking hydrocodone three times daily. Continues to wear sling and luna brace daily, but has had some irritation along the medial aspect of the right upper arm. States she applied corn starch to reduce the moisture in the brace.       Objective:  Pulse 69   Ht 172.7 cm (68\")   Wt 81.6 kg (180 lb)   BMI 27.37 kg/m²     Physical Examination:  Right upper extremity:  Friction-abrasion along the medial aspect of the distal bicep. No ecchymosis.  Mild fluid accumulation subcutaneously along the anterior aspect of the proximal forearm.      Imaging:   XR Shoulder 2+ View Right (08/22/2022 10:46)    FINDINGS:  Reverse total right shoulder arthroplasty changes are present. The  prosthesis appears appropriately seated.     What was previously described as a nondisplaced spiral fracture of the  humerus at the distal third of the prosthesis stem is not  well-visualized on today's examination. No periprosthetic fracture is  visualized. Prosthesis remains appropriately located. Imaged right ribs  appear intact. Presumed surgical resection of the distal right clavicle,  similar to prior exam.     IMPRESSION:     1. A previously described fracture of the right humerus at the distal  margin of the femoral stem is not visualized on today's study.      Assessment:    Diagnoses and all orders for this visit:    1. Orthopedic aftercare (Primary)  -     XR Shoulder 2+ View Right    2. Closed nondisplaced oblique fracture of shaft of right humerus with delayed healing, subsequent encounter    3. Osteoarthritis of right glenohumeral joint  -     HYDROcodone-acetaminophen (NORCO) 5-325 MG per tablet; Take 1 tablet by mouth Every 6 (Six) Hours As Needed for Moderate Pain .  Dispense: 20 tablet; Refill: " 0      Plan: Heat adjustment to the Stanton brace today, New script for hydrocodone for severe pain and follow up in 3 weeks for repeat x-ray.       Disclaimer: Part of this note may be an electronic transcription/translation of spoken language to printed text using the Dragon Dictation System

## 2022-09-12 ENCOUNTER — OFFICE VISIT (OUTPATIENT)
Dept: ORTHOPEDIC SURGERY | Facility: CLINIC | Age: 72
End: 2022-09-12

## 2022-09-12 VITALS — WEIGHT: 180 LBS | BODY MASS INDEX: 27.28 KG/M2 | HEIGHT: 68 IN | OXYGEN SATURATION: 97 % | HEART RATE: 64 BPM

## 2022-09-12 DIAGNOSIS — M25.511 RIGHT SHOULDER PAIN, UNSPECIFIED CHRONICITY: Primary | ICD-10-CM

## 2022-09-12 DIAGNOSIS — S42.334G: ICD-10-CM

## 2022-09-12 DIAGNOSIS — Z47.89 ORTHOPEDIC AFTERCARE: ICD-10-CM

## 2022-09-12 PROCEDURE — 99024 POSTOP FOLLOW-UP VISIT: CPT | Performed by: PHYSICIAN ASSISTANT

## 2022-09-12 NOTE — PROGRESS NOTES
" Patient ID: Ramona Luis is a 72 y.o. female.  Presents for follow-up on closed oblique fracture of right humeral shaft.  She has been compliant with wearing the Stanton brace and being in sling.  She is no longer taking any medication for pain.    Right reverse total shoulder arthroplasty with subscapularis repair on 6/29/2022    Objective:  Pulse 64   Ht 172.7 cm (67.99\")   Wt 81.6 kg (180 lb)   SpO2 97%   BMI 27.38 kg/m²     Physical Examination:  Right shoulder:    Forward flexion 90,  Abduction 75, IR iliac crest, ER 10.  Tenderness with external rotation and at the extremes of forward flexion and abduction.      Imaging:   XR shakira shoulder 2+ vw r (09/12/2022)     FINDINGS:  Reverse right shoulder prosthesis again noted. The alignment of the  prosthesis is unchanged from the prior study. No periprosthetic  fracture. Suspected prior resection at the distal right clavicle,  unchanged. The right lung is clear.      IMPRESSION:  Stable position of reverse right shoulder prosthesis. No fracture.    Assessment:    Diagnoses and all orders for this visit:    1. Right shoulder pain, unspecified chronicity (Primary)  -     XR Shoulder 2+ View Right        Plan: Discontinue brace and sling today. Resume physical therapy following right rTSA. Restrict lifting to < 5 lbs in the right arm. Follow up with Dr. Briceno in 2 months.       Disclaimer: Part of this note may be an electronic transcription/translation of spoken language to printed text using the Dragon Dictation System  "

## 2022-11-03 ENCOUNTER — OFFICE VISIT (OUTPATIENT)
Dept: ORTHOPEDIC SURGERY | Facility: CLINIC | Age: 72
End: 2022-11-03

## 2022-11-03 VITALS — HEIGHT: 68 IN | HEART RATE: 78 BPM | WEIGHT: 180 LBS | BODY MASS INDEX: 27.28 KG/M2

## 2022-11-03 DIAGNOSIS — M19.011 OSTEOARTHRITIS OF RIGHT GLENOHUMERAL JOINT: Primary | ICD-10-CM

## 2022-11-03 PROCEDURE — 99212 OFFICE O/P EST SF 10 MIN: CPT | Performed by: ORTHOPAEDIC SURGERY

## 2022-11-03 RX ORDER — AZELASTINE HYDROCHLORIDE 0.5 MG/ML
SOLUTION/ DROPS OPHTHALMIC
COMMUNITY
Start: 2022-11-01

## 2022-11-03 RX ORDER — NYSTATIN 100000 [USP'U]/G
POWDER TOPICAL
COMMUNITY
Start: 2022-09-15

## 2022-11-03 RX ORDER — FLURBIPROFEN SODIUM 0.3 MG/ML
SOLUTION/ DROPS OPHTHALMIC
COMMUNITY
Start: 2022-10-03

## 2022-11-03 RX ORDER — PROCHLORPERAZINE 25 MG/1
SUPPOSITORY RECTAL SEE ADMIN INSTRUCTIONS
COMMUNITY
Start: 2022-10-08

## 2022-11-03 RX ORDER — ESCITALOPRAM OXALATE 5 MG/1
TABLET ORAL
COMMUNITY
Start: 2022-11-02

## 2022-11-03 RX ORDER — METFORMIN HYDROCHLORIDE 500 MG/1
1000 TABLET, EXTENDED RELEASE ORAL 2 TIMES DAILY
COMMUNITY
Start: 2022-10-07

## 2022-11-03 RX ORDER — HYDRALAZINE HYDROCHLORIDE 50 MG/1
50 TABLET, FILM COATED ORAL 4 TIMES DAILY
COMMUNITY
Start: 2022-10-31

## 2022-11-03 NOTE — PROGRESS NOTES
"     Patient ID: Ramona Luis is a 72 y.o. female.  Right shoulder pain  6/29/22 right reverse total shoulder with subscapularis repair  Did a home program due to illness after her surgery pain minimal    Review of Systems:        Objective:    Pulse 78   Ht 172.7 cm (67.99\")   Wt 81.6 kg (180 lb)   BMI 27.38 kg/m²     Physical Examination:     Incision is healed active elevation 180 abduction 160 external rotation 40 internal rotation S1    Imaging:       Assessment:    Doing well after shoulder arthroplasty    Plan:   Activity as tolerated x-ray in 6 to 7-month      Procedures          Disclaimer: Part of this note may be an electronic transcription/translation of spoken language to printed text using the Dragon Dictation System  "

## 2023-05-30 ENCOUNTER — TELEPHONE (OUTPATIENT)
Dept: NEUROSURGERY | Facility: CLINIC | Age: 73
End: 2023-05-30

## 2023-05-30 NOTE — TELEPHONE ENCOUNTER
Called the patient as she was referred to Dr. Mosher for Right temporal lobe infarction. I explained to her that she needs to see Neurology and not Neurosurgery. They are aware that scheduling is reaching back out to the referring office to get a new referral for Neurology. They also came into the office and the above was explained to them. They will contact PCP office.

## 2024-04-11 ENCOUNTER — OFFICE (AMBULATORY)
Dept: URBAN - METROPOLITAN AREA CLINIC 64 | Facility: CLINIC | Age: 74
End: 2024-04-11
Payer: COMMERCIAL

## 2024-04-11 VITALS
HEIGHT: 59 IN | SYSTOLIC BLOOD PRESSURE: 140 MMHG | WEIGHT: 169 LBS | HEART RATE: 67 BPM | DIASTOLIC BLOOD PRESSURE: 78 MMHG

## 2024-04-11 DIAGNOSIS — I25.10 ATHEROSCLEROTIC HEART DISEASE OF NATIVE CORONARY ARTERY WITH: ICD-10-CM

## 2024-04-11 DIAGNOSIS — R13.10 DYSPHAGIA, UNSPECIFIED: ICD-10-CM

## 2024-04-11 DIAGNOSIS — E11.9 TYPE 2 DIABETES MELLITUS WITHOUT COMPLICATIONS: ICD-10-CM

## 2024-04-11 DIAGNOSIS — R10.10 UPPER ABDOMINAL PAIN, UNSPECIFIED: ICD-10-CM

## 2024-04-11 DIAGNOSIS — Z86.010 PERSONAL HISTORY OF COLONIC POLYPS: ICD-10-CM

## 2024-04-11 DIAGNOSIS — K21.9 GASTRO-ESOPHAGEAL REFLUX DISEASE WITHOUT ESOPHAGITIS: ICD-10-CM

## 2024-04-11 PROCEDURE — 99214 OFFICE O/P EST MOD 30 MIN: CPT | Performed by: NURSE PRACTITIONER

## 2024-05-28 ENCOUNTER — OFFICE (AMBULATORY)
Dept: URBAN - METROPOLITAN AREA PATHOLOGY 19 | Facility: PATHOLOGY | Age: 74
End: 2024-05-28
Payer: COMMERCIAL

## 2024-05-28 ENCOUNTER — ON CAMPUS - OUTPATIENT (AMBULATORY)
Dept: URBAN - METROPOLITAN AREA HOSPITAL 2 | Facility: HOSPITAL | Age: 74
End: 2024-05-28
Payer: COMMERCIAL

## 2024-05-28 VITALS
HEART RATE: 67 BPM | DIASTOLIC BLOOD PRESSURE: 92 MMHG | SYSTOLIC BLOOD PRESSURE: 146 MMHG | HEART RATE: 70 BPM | DIASTOLIC BLOOD PRESSURE: 97 MMHG | HEIGHT: 59 IN | RESPIRATION RATE: 16 BRPM | DIASTOLIC BLOOD PRESSURE: 59 MMHG | OXYGEN SATURATION: 96 % | DIASTOLIC BLOOD PRESSURE: 74 MMHG | SYSTOLIC BLOOD PRESSURE: 170 MMHG | DIASTOLIC BLOOD PRESSURE: 110 MMHG | HEART RATE: 74 BPM | HEART RATE: 73 BPM | SYSTOLIC BLOOD PRESSURE: 166 MMHG | HEART RATE: 76 BPM | SYSTOLIC BLOOD PRESSURE: 171 MMHG | DIASTOLIC BLOOD PRESSURE: 94 MMHG | DIASTOLIC BLOOD PRESSURE: 82 MMHG | RESPIRATION RATE: 17 BRPM | OXYGEN SATURATION: 100 % | DIASTOLIC BLOOD PRESSURE: 83 MMHG | SYSTOLIC BLOOD PRESSURE: 176 MMHG | OXYGEN SATURATION: 97 % | HEART RATE: 71 BPM | DIASTOLIC BLOOD PRESSURE: 84 MMHG | RESPIRATION RATE: 18 BRPM | SYSTOLIC BLOOD PRESSURE: 125 MMHG | HEART RATE: 66 BPM | SYSTOLIC BLOOD PRESSURE: 205 MMHG | SYSTOLIC BLOOD PRESSURE: 116 MMHG | WEIGHT: 164 LBS | DIASTOLIC BLOOD PRESSURE: 182 MMHG | SYSTOLIC BLOOD PRESSURE: 156 MMHG | SYSTOLIC BLOOD PRESSURE: 181 MMHG | DIASTOLIC BLOOD PRESSURE: 80 MMHG | TEMPERATURE: 97.3 F

## 2024-05-28 DIAGNOSIS — D12.0 BENIGN NEOPLASM OF CECUM: ICD-10-CM

## 2024-05-28 DIAGNOSIS — D12.3 BENIGN NEOPLASM OF TRANSVERSE COLON: ICD-10-CM

## 2024-05-28 DIAGNOSIS — D12.4 BENIGN NEOPLASM OF DESCENDING COLON: ICD-10-CM

## 2024-05-28 DIAGNOSIS — R13.10 DYSPHAGIA, UNSPECIFIED: ICD-10-CM

## 2024-05-28 DIAGNOSIS — Z86.010 PERSONAL HISTORY OF COLONIC POLYPS: ICD-10-CM

## 2024-05-28 DIAGNOSIS — K57.30 DIVERTICULOSIS OF LARGE INTESTINE WITHOUT PERFORATION OR ABS: ICD-10-CM

## 2024-05-28 DIAGNOSIS — Z09 ENCOUNTER FOR FOLLOW-UP EXAMINATION AFTER COMPLETED TREATMEN: ICD-10-CM

## 2024-05-28 PROBLEM — K63.5 POLYP OF COLON: Status: ACTIVE | Noted: 2024-05-28

## 2024-05-28 LAB
GI HISTOLOGY: A. TRANSVERSE COLON: (no result)
GI HISTOLOGY: B. CECUM: (no result)
GI HISTOLOGY: C. DESCENDING COLON: (no result)
GI HISTOLOGY: PDF REPORT: (no result)

## 2024-05-28 PROCEDURE — 43450 DILATE ESOPHAGUS 1/MULT PASS: CPT | Performed by: INTERNAL MEDICINE

## 2024-05-28 PROCEDURE — 88305 TISSUE EXAM BY PATHOLOGIST: CPT | Mod: 26 | Performed by: PATHOLOGY

## 2024-05-28 PROCEDURE — 45385 COLONOSCOPY W/LESION REMOVAL: CPT | Mod: PT | Performed by: INTERNAL MEDICINE

## 2024-05-28 PROCEDURE — 43235 EGD DIAGNOSTIC BRUSH WASH: CPT | Performed by: INTERNAL MEDICINE

## 2024-12-17 ENCOUNTER — PATIENT ROUNDING (BHMG ONLY) (OUTPATIENT)
Dept: URGENT CARE | Facility: CLINIC | Age: 74
End: 2024-12-17
Payer: MEDICARE

## 2024-12-17 NOTE — ED NOTES
Thank you for letting us care for you in your recent visit to our urgent care center. We would love to hear about your experience with us. Was this the first time you have visited our location?    We’re always looking for ways to make our patients’ experiences even better. Do you have any recommendations on ways we may improve?     I appreciate you taking the time to respond. Please be on the lookout for a survey about your recent visit from [employer] via text or email. We would greatly appreciate if you could fill that out and turn it back in. We want your voice to be heard and we value your feedback.   Thank you for choosing [facility] for your healthcare needs.

## 2025-05-13 ENCOUNTER — OFFICE (AMBULATORY)
Dept: URBAN - METROPOLITAN AREA CLINIC 64 | Facility: CLINIC | Age: 75
End: 2025-05-13
Payer: COMMERCIAL

## 2025-05-13 VITALS
WEIGHT: 172 LBS | HEART RATE: 66 BPM | HEIGHT: 59 IN | SYSTOLIC BLOOD PRESSURE: 166 MMHG | DIASTOLIC BLOOD PRESSURE: 70 MMHG

## 2025-05-13 DIAGNOSIS — R19.4 CHANGE IN BOWEL HABIT: ICD-10-CM

## 2025-05-13 DIAGNOSIS — K21.9 GASTRO-ESOPHAGEAL REFLUX DISEASE WITHOUT ESOPHAGITIS: ICD-10-CM

## 2025-05-13 DIAGNOSIS — R11.2 NAUSEA WITH VOMITING, UNSPECIFIED: ICD-10-CM

## 2025-05-13 DIAGNOSIS — R10.10 UPPER ABDOMINAL PAIN, UNSPECIFIED: ICD-10-CM

## 2025-05-13 PROCEDURE — 99214 OFFICE O/P EST MOD 30 MIN: CPT | Performed by: NURSE PRACTITIONER

## 2025-05-13 RX ORDER — PANTOPRAZOLE SODIUM 40 MG/1
TABLET, DELAYED RELEASE ORAL
Qty: 30 | Refills: 3 | Status: ACTIVE
Start: 2025-05-13

## 2025-05-13 RX ORDER — FAMOTIDINE 40 MG/1
40 TABLET, FILM COATED ORAL
Qty: 30 | Refills: 11 | Status: ACTIVE
Start: 2025-05-13

## 2025-08-14 ENCOUNTER — OFFICE (AMBULATORY)
Dept: URBAN - METROPOLITAN AREA CLINIC 64 | Facility: CLINIC | Age: 75
End: 2025-08-14
Payer: COMMERCIAL

## 2025-08-14 VITALS
HEIGHT: 59 IN | DIASTOLIC BLOOD PRESSURE: 60 MMHG | WEIGHT: 176 LBS | SYSTOLIC BLOOD PRESSURE: 128 MMHG | HEART RATE: 64 BPM

## 2025-08-14 DIAGNOSIS — R11.2 NAUSEA WITH VOMITING, UNSPECIFIED: ICD-10-CM

## 2025-08-14 DIAGNOSIS — K59.02 OUTLET DYSFUNCTION CONSTIPATION: ICD-10-CM

## 2025-08-14 DIAGNOSIS — K21.9 GASTRO-ESOPHAGEAL REFLUX DISEASE WITHOUT ESOPHAGITIS: ICD-10-CM

## 2025-08-14 PROCEDURE — 99214 OFFICE O/P EST MOD 30 MIN: CPT | Performed by: NURSE PRACTITIONER

## (undated) DEVICE — WRAP SHOULDER COLD THERAPY

## (undated) DEVICE — KT SURG TURNOVER 050

## (undated) DEVICE — UNDERGLV SURG BIOGEL INDICAT PI SZ8 BLU

## (undated) DEVICE — SOL IRRIG NACL 1000ML

## (undated) DEVICE — SUT MNCRYL 3/0 Y936H

## (undated) DEVICE — T-MAX DISPOSABLE FACE MASK 8 PER BOX

## (undated) DEVICE — SOL IRR NACL 0.9PCT ARTHROMATIC 3000ML

## (undated) DEVICE — NDL SUT CATGUT 1/2CIR TPR SZ6 1824-6D PK/2

## (undated) DEVICE — SYR LUERLOK 20CC BX/50

## (undated) DEVICE — SOLUTION,WATER,IRRIGATION,1000ML,STERILE: Brand: MEDLINE

## (undated) DEVICE — PK TOTL SHLDR 50

## (undated) DEVICE — DRSNG SURESITE WNDW 4X4.5

## (undated) DEVICE — TOWEL,OR,DSP,ST,WHITE,DLX,4/PK,20PK/CS: Brand: MEDLINE

## (undated) DEVICE — ADHS SKIN PREMIERPRO EXOFIN TOPICAL HI/VISC .5ML

## (undated) DEVICE — GLV SURG SIGNATURE ESSENTIAL PF LTX SZ8.5

## (undated) DEVICE — UNDYED BRAIDED (POLYGLACTIN 910), SYNTHETIC ABSORBABLE SUTURE: Brand: COATED VICRYL

## (undated) DEVICE — GLV SURG SENSICARE PI ORTHO SZ8 LF STRL

## (undated) DEVICE — SLV SCD CALF HEMOFORCE DVT THERP REPROC MD

## (undated) DEVICE — MARKR SKIN W/RULR

## (undated) DEVICE — DECANTER: Brand: UNBRANDED

## (undated) DEVICE — SUT VIC 2/0 CT1 27IN J259H

## (undated) DEVICE — DUAL CUT SAGITTAL BLADE